# Patient Record
Sex: FEMALE | Race: BLACK OR AFRICAN AMERICAN | NOT HISPANIC OR LATINO | Employment: OTHER | URBAN - METROPOLITAN AREA
[De-identification: names, ages, dates, MRNs, and addresses within clinical notes are randomized per-mention and may not be internally consistent; named-entity substitution may affect disease eponyms.]

---

## 2017-05-17 PROBLEM — F41.8 DEPRESSION WITH ANXIETY: Status: ACTIVE | Noted: 2017-05-17

## 2017-05-17 PROBLEM — M81.0 OSTEOPOROSIS: Status: ACTIVE | Noted: 2017-05-17

## 2017-08-25 PROBLEM — B18.2 CHRONIC HEPATITIS C WITHOUT HEPATIC COMA: Status: ACTIVE | Noted: 2017-08-25

## 2017-08-25 PROBLEM — R05.9 COUGH: Status: ACTIVE | Noted: 2017-08-25

## 2017-08-25 PROBLEM — F17.200 TOBACCO DEPENDENCE: Status: ACTIVE | Noted: 2017-08-25

## 2017-08-25 PROBLEM — R64 CACHEXIA: Chronic | Status: ACTIVE | Noted: 2017-08-25

## 2017-10-08 PROBLEM — Z00.00 HEALTHCARE MAINTENANCE: Status: ACTIVE | Noted: 2017-10-08

## 2018-01-08 PROBLEM — Z00.00 HEALTHCARE MAINTENANCE: Status: RESOLVED | Noted: 2017-10-08 | Resolved: 2018-01-08

## 2018-02-17 ENCOUNTER — NURSE TRIAGE (OUTPATIENT)
Dept: ADMINISTRATIVE | Facility: CLINIC | Age: 68
End: 2018-02-17

## 2018-09-10 PROBLEM — Z00.00 HEALTHCARE MAINTENANCE: Status: RESOLVED | Noted: 2017-10-08 | Resolved: 2018-09-10

## 2018-09-21 ENCOUNTER — TELEPHONE (OUTPATIENT)
Dept: ADMINISTRATIVE | Facility: HOSPITAL | Age: 68
End: 2018-09-21

## 2019-03-19 RX ORDER — QUETIAPINE FUMARATE 200 MG/1
TABLET, FILM COATED ORAL
Qty: 30 TABLET | Refills: 0 | OUTPATIENT
Start: 2019-03-19

## 2019-03-19 RX ORDER — QUETIAPINE FUMARATE 200 MG/1
TABLET, FILM COATED ORAL
Qty: 30 TABLET | Refills: 0 | Status: SHIPPED | OUTPATIENT
Start: 2019-03-19

## 2019-10-03 ENCOUNTER — PATIENT OUTREACH (OUTPATIENT)
Dept: ADMINISTRATIVE | Facility: HOSPITAL | Age: 69
End: 2019-10-03

## 2019-11-13 ENCOUNTER — PATIENT OUTREACH (OUTPATIENT)
Dept: ADMINISTRATIVE | Facility: HOSPITAL | Age: 69
End: 2019-11-13

## 2020-02-12 ENCOUNTER — TELEPHONE (OUTPATIENT)
Dept: PAIN MEDICINE | Facility: CLINIC | Age: 70
End: 2020-02-12

## 2020-02-12 NOTE — TELEPHONE ENCOUNTER
Staff contacted the patient to confirm his 02/13/20 8:30 am appointment with Dr. Bingham and review IPM. Patient can contact our office at [phone] to reschedule or cancel if needed.      Patient confirmed the appointment and verbalized understanding or the appointment date and time. Patient verified the above information and location of office visit as well.

## 2020-04-08 ENCOUNTER — TELEPHONE (OUTPATIENT)
Dept: PAIN MEDICINE | Facility: CLINIC | Age: 70
End: 2020-04-08

## 2020-04-08 NOTE — TELEPHONE ENCOUNTER
lvm for patient in reference to rescheduling appt staff left callback number for patient so that we can further discuss appt options

## 2020-04-14 ENCOUNTER — TELEPHONE (OUTPATIENT)
Dept: PAIN MEDICINE | Facility: CLINIC | Age: 70
End: 2020-04-14

## 2020-04-14 NOTE — TELEPHONE ENCOUNTER
Spoke with patient regarding message and back pain, patient has never seen Dr Bingham before, declined to re-schedule missed appointment, stated she would call back to re-schedule her appointment

## 2020-04-14 NOTE — TELEPHONE ENCOUNTER
----- Message from Lucía Mitchell sent at 4/14/2020 10:47 AM CDT -----  Contact: pt  Name of Who is Calling: Jesika Wyman      What is the request in detail: pt states that she is in pain and would like to know if she can get something for her back. States that she missed her appointment and would like something until she can come in. Please contact to further discuss and advise.      Can the clinic reply by [facility]: n      What Number to [facility] Back [facility]: [phone]

## 2021-08-17 ENCOUNTER — IMMUNIZATION (OUTPATIENT)
Dept: PRIMARY CARE CLINIC | Facility: CLINIC | Age: 71
End: 2021-08-17
Payer: MEDICARE

## 2021-08-17 DIAGNOSIS — Z23 NEED FOR VACCINATION: Primary | ICD-10-CM

## 2021-08-17 PROCEDURE — 91300 COVID-19, MRNA, LNP-S, PF, 30 MCG/0.3 ML DOSE VACCINE: ICD-10-PCS | Mod: S$GLB,,, | Performed by: INTERNAL MEDICINE

## 2021-08-17 PROCEDURE — 0001A COVID-19, MRNA, LNP-S, PF, 30 MCG/0.3 ML DOSE VACCINE: ICD-10-PCS | Mod: CV19,S$GLB,, | Performed by: INTERNAL MEDICINE

## 2021-08-17 PROCEDURE — 91300 COVID-19, MRNA, LNP-S, PF, 30 MCG/0.3 ML DOSE VACCINE: CPT | Mod: S$GLB,,, | Performed by: INTERNAL MEDICINE

## 2021-08-17 PROCEDURE — 0001A COVID-19, MRNA, LNP-S, PF, 30 MCG/0.3 ML DOSE VACCINE: CPT | Mod: CV19,S$GLB,, | Performed by: INTERNAL MEDICINE

## 2021-09-08 ENCOUNTER — IMMUNIZATION (OUTPATIENT)
Dept: INTERNAL MEDICINE | Facility: CLINIC | Age: 71
End: 2021-09-08
Payer: MEDICARE

## 2021-09-08 DIAGNOSIS — Z23 NEED FOR VACCINATION: Primary | ICD-10-CM

## 2021-09-08 PROCEDURE — 91300 COVID-19, MRNA, LNP-S, PF, 30 MCG/0.3 ML DOSE VACCINE: CPT | Mod: ,,, | Performed by: INTERNAL MEDICINE

## 2021-09-08 PROCEDURE — 0002A COVID-19, MRNA, LNP-S, PF, 30 MCG/0.3 ML DOSE VACCINE: ICD-10-PCS | Mod: CV19,,, | Performed by: INTERNAL MEDICINE

## 2021-09-08 PROCEDURE — 0002A COVID-19, MRNA, LNP-S, PF, 30 MCG/0.3 ML DOSE VACCINE: CPT | Mod: CV19,,, | Performed by: INTERNAL MEDICINE

## 2021-09-08 PROCEDURE — 91300 COVID-19, MRNA, LNP-S, PF, 30 MCG/0.3 ML DOSE VACCINE: ICD-10-PCS | Mod: ,,, | Performed by: INTERNAL MEDICINE

## 2022-04-18 ENCOUNTER — PATIENT MESSAGE (OUTPATIENT)
Dept: PAIN MEDICINE | Facility: CLINIC | Age: 72
End: 2022-04-18
Payer: MEDICARE

## 2022-04-19 ENCOUNTER — OFFICE VISIT (OUTPATIENT)
Dept: SPINE | Facility: CLINIC | Age: 72
End: 2022-04-19
Attending: ANESTHESIOLOGY
Payer: MEDICARE

## 2022-04-19 VITALS
DIASTOLIC BLOOD PRESSURE: 75 MMHG | HEART RATE: 75 BPM | RESPIRATION RATE: 18 BRPM | BODY MASS INDEX: 15.24 KG/M2 | HEIGHT: 66 IN | SYSTOLIC BLOOD PRESSURE: 144 MMHG | TEMPERATURE: 98 F | WEIGHT: 94.81 LBS

## 2022-04-19 DIAGNOSIS — M54.9 INTRACTABLE BACK PAIN: ICD-10-CM

## 2022-04-19 DIAGNOSIS — M48.061 SPINAL STENOSIS OF LUMBAR REGION, UNSPECIFIED WHETHER NEUROGENIC CLAUDICATION PRESENT: ICD-10-CM

## 2022-04-19 DIAGNOSIS — M47.26 OSTEOARTHRITIS OF SPINE WITH RADICULOPATHY, LUMBAR REGION: Primary | ICD-10-CM

## 2022-04-19 PROCEDURE — 1125F AMNT PAIN NOTED PAIN PRSNT: CPT | Mod: CPTII,S$GLB,, | Performed by: ANESTHESIOLOGY

## 2022-04-19 PROCEDURE — 99999 PR PBB SHADOW E&M-EST. PATIENT-LVL III: ICD-10-PCS | Mod: PBBFAC,,, | Performed by: ANESTHESIOLOGY

## 2022-04-19 PROCEDURE — 1160F RVW MEDS BY RX/DR IN RCRD: CPT | Mod: CPTII,S$GLB,, | Performed by: ANESTHESIOLOGY

## 2022-04-19 PROCEDURE — 99205 OFFICE O/P NEW HI 60 MIN: CPT | Mod: S$GLB,,, | Performed by: ANESTHESIOLOGY

## 2022-04-19 PROCEDURE — 1159F PR MEDICATION LIST DOCUMENTED IN MEDICAL RECORD: ICD-10-PCS | Mod: CPTII,S$GLB,, | Performed by: ANESTHESIOLOGY

## 2022-04-19 PROCEDURE — 1160F PR REVIEW ALL MEDS BY PRESCRIBER/CLIN PHARMACIST DOCUMENTED: ICD-10-PCS | Mod: CPTII,S$GLB,, | Performed by: ANESTHESIOLOGY

## 2022-04-19 PROCEDURE — 3078F DIAST BP <80 MM HG: CPT | Mod: CPTII,S$GLB,, | Performed by: ANESTHESIOLOGY

## 2022-04-19 PROCEDURE — 99999 PR PBB SHADOW E&M-EST. PATIENT-LVL III: CPT | Mod: PBBFAC,,, | Performed by: ANESTHESIOLOGY

## 2022-04-19 PROCEDURE — 3008F BODY MASS INDEX DOCD: CPT | Mod: CPTII,S$GLB,, | Performed by: ANESTHESIOLOGY

## 2022-04-19 PROCEDURE — 3078F PR MOST RECENT DIASTOLIC BLOOD PRESSURE < 80 MM HG: ICD-10-PCS | Mod: CPTII,S$GLB,, | Performed by: ANESTHESIOLOGY

## 2022-04-19 PROCEDURE — 3077F SYST BP >= 140 MM HG: CPT | Mod: CPTII,S$GLB,, | Performed by: ANESTHESIOLOGY

## 2022-04-19 PROCEDURE — 1100F PR PT FALLS ASSESS DOC 2+ FALLS/FALL W/INJURY/YR: ICD-10-PCS | Mod: CPTII,S$GLB,, | Performed by: ANESTHESIOLOGY

## 2022-04-19 PROCEDURE — 3008F PR BODY MASS INDEX (BMI) DOCUMENTED: ICD-10-PCS | Mod: CPTII,S$GLB,, | Performed by: ANESTHESIOLOGY

## 2022-04-19 PROCEDURE — 1100F PTFALLS ASSESS-DOCD GE2>/YR: CPT | Mod: CPTII,S$GLB,, | Performed by: ANESTHESIOLOGY

## 2022-04-19 PROCEDURE — 3288F PR FALLS RISK ASSESSMENT DOCUMENTED: ICD-10-PCS | Mod: CPTII,S$GLB,, | Performed by: ANESTHESIOLOGY

## 2022-04-19 PROCEDURE — 3288F FALL RISK ASSESSMENT DOCD: CPT | Mod: CPTII,S$GLB,, | Performed by: ANESTHESIOLOGY

## 2022-04-19 PROCEDURE — 1159F MED LIST DOCD IN RCRD: CPT | Mod: CPTII,S$GLB,, | Performed by: ANESTHESIOLOGY

## 2022-04-19 PROCEDURE — 1125F PR PAIN SEVERITY QUANTIFIED, PAIN PRESENT: ICD-10-PCS | Mod: CPTII,S$GLB,, | Performed by: ANESTHESIOLOGY

## 2022-04-19 PROCEDURE — 3077F PR MOST RECENT SYSTOLIC BLOOD PRESSURE >= 140 MM HG: ICD-10-PCS | Mod: CPTII,S$GLB,, | Performed by: ANESTHESIOLOGY

## 2022-04-19 PROCEDURE — 99205 PR OFFICE/OUTPT VISIT, NEW, LEVL V, 60-74 MIN: ICD-10-PCS | Mod: S$GLB,,, | Performed by: ANESTHESIOLOGY

## 2022-04-19 RX ORDER — HYDROCODONE BITARTRATE AND ACETAMINOPHEN 5; 325 MG/1; MG/1
1 TABLET ORAL EVERY 8 HOURS PRN
Qty: 90 TABLET | Refills: 0 | Status: SHIPPED | OUTPATIENT
Start: 2022-04-19 | End: 2022-05-10 | Stop reason: SDUPTHER

## 2022-04-19 NOTE — PROGRESS NOTES
Subjective:      Patient ID: Jesika Wyman is a 71 y.o. female.    Chief Complaint: Low-back Pain    Referred by: Self, Ishan EASLEY  This is a patient of Dr. Duque who recently retired.  He had her on hydrocodone 5 q.i.d..  She was staying in [facility] from her primary care physician.  She said these were providing her good relief.  She ran out of medications about a month ago.  She did not have withdrawal symptoms but the pain was severe.  She had significant scoliosis.  She has lumbar spine MRI with multilevel degenerative disease and variable amount of foraminal stenosis and mild central canal stenosis.  MRI was reviewed.  She denied having any bowel or bladder symptomatology.  She has radicular symptoms down the right lower extremity in an L4 or L5 distribution.  She occasionally will feel weakness or give away type sensation.  Occasionally she will have tingling and numbness down the right lower extremity.  When I asked her about surgical intervention for her scoliotic curvature and lumbar spine pain she said the surgeon told her it is too late and this should have happened when she was a teenager.  Previous interventional management was once done and cause her more pain than pain relief.  She does not want to try again.  No recent weight changes.  No history of cancer.    Past Medical History:   Diagnosis Date    COPD (chronic obstructive pulmonary disease)     Hemorrhoids     Insomnia        Past Surgical History:   Procedure Laterality Date    HEMORRHOID SURGERY         Review of patient's allergies indicates:   Allergen Reactions    Tramadol Nausea And Vomiting    Ibuprofen Nausea And Vomiting       Current Outpatient Medications   Medication Sig Dispense Refill    QUEtiapine (SEROQUEL) 200 MG Tab TAKE 1 TABLET BY MOUTH EVERY EVENING 30 tablet 0    albuterol 90 mcg/actuation inhaler Inhale 2 puffs into the lungs every 6 (six) hours as needed for Wheezing. Rescue 1 Inhaler 11    citalopram  "(CELEXA) 10 MG tablet Take 1 tablet (10 mg total) by mouth once daily. 30 tablet 11    HYDROcodone-acetaminophen (NORCO) 5-325 mg per tablet Take 1 tablet by mouth every 8 (eight) hours as needed for Pain. Take for breakthrough pain. Severe spinal stenosis and radiculopathy 90 tablet 0    hydrOXYzine HCl (ATARAX) 25 MG tablet Take 1 tablet (25 mg total) by mouth 2 (two) times daily as needed for Anxiety. (Patient not taking: Reported on 4/19/2022) 30 tablet 1    morphine (MS CONTIN) 15 MG 12 hr tablet Take 1 tablet (15 mg total) by mouth 2 (two) times daily. (Patient not taking: Reported on 4/19/2022) 60 tablet 0    umeclidinium-vilanterol (ANORO ELLIPTA) 62.5-25 mcg/actuation DsDv Inhale 1 puff into the lungs once daily. (Patient not taking: Reported on 4/19/2022) 60 each 11     No current facility-administered medications for this visit.       Family History   Problem Relation Age of Onset    Hypertension Mother     Cancer Father        Social History     Socioeconomic History    Marital status: Single    Years of education: 11   Tobacco Use    Smoking status: Current Every Day Smoker     Packs/day: 0.25     Years: 35.00     Pack years: 8.75     Types: Cigarettes    Smokeless tobacco: Never Used   Substance and Sexual Activity    Alcohol use: No     Alcohol/week: 0.0 standard drinks    Drug use: No           ROS        Objective:   BP (!) 144/75   Pulse 75   Temp 98 °F (36.7 °C)   Resp 18   Ht 5' 6" (1.676 m)   Wt 43 kg (94 lb 12.8 oz)   BMI 15.30 kg/m²   Pain Disability Index Review:  Last 3 PDI Scores 4/19/2022   Pain Disability Index (PDI) 63     Normocephalic.  Atraumatic.  Affect appropriate.  Breathing unlabored.  Extra ocular muscles intact.               General Musculoskeletal Exam   Gait: normal     Right Ankle/Foot Exam     Tests   Heel Walk: able to perform  Tiptoe Walk: able to perform    Left Ankle/Foot Exam     Tests   Heel Walk: able to perform  Tiptoe Walk: able to " perform      Right Hip Exam     Range of Motion   External rotation: normal   Internal rotation: normal     Tests   Pain w/ forced internal rotation (SINDHU): absent  Kym: negative    Other   Sensation: normal  Left Hip Exam     Range of Motion   External rotation: normal   Internal rotation: normal     Tests   Pain w/ forced internal rotation (SINDHU): absent  Kym: negative    Other   Sensation: normal      Back (L-Spine & T-Spine) / Neck (C-Spine) Exam     Back (L-Spine & T-Spine) Range of Motion   Extension: abnormal Back extension: Limited.  Scoliotic curvature noted.   Flexion: abnormal Back flexion: Limited scoliotic curvature noted.     Back (L-Spine & T-Spine) Tests   Right Side Tests  Femoral Stretch: negative  Left Side Tests  Femoral Stretch: negative    Comments:  FACET LOADING:  Positive bilaterally.    Positive pain with lumbar hyperextension.  Lumbar flexion limited.  Positive Bartolo's test for lower back pain.  Positive Milgram's.  Positive straight leg raise on the right side.        Muscle Strength   Right Lower Extremity   Hip Flexion: 5/5   Quadriceps:  5/5   Hamstrin/5   Gastrocsoleus:  5/5   EHL:  5/5  Left Lower Extremity   Hip Flexion: 5/5   Quadriceps:  5/5   Hamstrin/5   Gastrocsoleus:  5/5   EHL:  5/5    Reflexes     Left Side  Achilles:  0  Babinski Sign:  absent  Ankle Clonus:  absent  Quadriceps:  1+    Right Side   Achilles:  0  Babinski Sign:  absent  Ankle Clonus:  absent  Quadriceps:  1+    Vascular Exam     Right Pulses  Dorsalis Pedis:      1+          Left Pulses  Dorsalis Pedis:      1+          Capillary Refill  Right Hand: normal capillary refill  Left Hand: normal capillary refill        Edema  Right Upper Leg: absent  Left Upper Leg: absent        Assessment:       Encounter Diagnoses   Name Primary?    Osteoarthritis of spine with radiculopathy, lumbar region Yes    Intractable back pain     Spinal stenosis of lumbar region, unspecified whether neurogenic  claudication present          Plan:   We discussed with the patient the assessment and recommendations. The following is the plan we agreed on:  1. We will reduce her hydrocodone to 3 times a day and she is in agreement.  2. Offered her physical therapy and/or interventional management but she declined.  She is satisfied on the medications.  3. Return as needed.  Otherwise follow-up in 3 months to refill her medications.        Jesika was seen today for low-back pain.    Diagnoses and all orders for this visit:    Osteoarthritis of spine with radiculopathy, lumbar region  -     HYDROcodone-acetaminophen (NORCO) 5-325 mg per tablet; Take 1 tablet by mouth every 8 (eight) hours as needed for Pain. Take for breakthrough pain. Severe spinal stenosis and radiculopathy    Intractable back pain  -     HYDROcodone-acetaminophen (NORCO) 5-325 mg per tablet; Take 1 tablet by mouth every 8 (eight) hours as needed for Pain. Take for breakthrough pain. Severe spinal stenosis and radiculopathy    Spinal stenosis of lumbar region, unspecified whether neurogenic claudication present  -     HYDROcodone-acetaminophen (NORCO) 5-325 mg per tablet; Take 1 tablet by mouth every 8 (eight) hours as needed for Pain. Take for breakthrough pain. Severe spinal stenosis and radiculopathy

## 2022-05-10 ENCOUNTER — TELEPHONE (OUTPATIENT)
Dept: PAIN MEDICINE | Facility: CLINIC | Age: 72
End: 2022-05-10
Payer: MEDICARE

## 2022-05-10 DIAGNOSIS — M54.9 INTRACTABLE BACK PAIN: ICD-10-CM

## 2022-05-10 DIAGNOSIS — M48.061 SPINAL STENOSIS OF LUMBAR REGION, UNSPECIFIED WHETHER NEUROGENIC CLAUDICATION PRESENT: ICD-10-CM

## 2022-05-10 DIAGNOSIS — M47.26 OSTEOARTHRITIS OF SPINE WITH RADICULOPATHY, LUMBAR REGION: ICD-10-CM

## 2022-05-10 RX ORDER — HYDROCODONE BITARTRATE AND ACETAMINOPHEN 5; 325 MG/1; MG/1
1 TABLET ORAL EVERY 8 HOURS PRN
Qty: 90 TABLET | Refills: 0 | Status: SHIPPED | OUTPATIENT
Start: 2022-05-18 | End: 2022-05-16 | Stop reason: SDUPTHER

## 2022-05-10 NOTE — TELEPHONE ENCOUNTER
----- Message from Ewelina Saldaña sent at 5/10/2022  9:49 AM CDT -----  Type:  RX Refill Request    Who Called: TENZIN   Refill or New Rx:refill   RX Name and Strength:HYDROcodone-acetaminophen (NORCO) 5-325 mg per tablet  How is the patient currently taking it? (ex. 1XDay): 3 X DAY   Is this a 30 day or 90 day RX:90  Preferred Pharmacy with phone number:[facility] #[id number] [address] or Mail Order:LOCAL   Ordering Provider:Ijeoma Currie,  Would the patient rather a call back or a response via [facility]? CALL   Best Call Back [phone]  Additional Information: Pt called and said she is out of medication to please call for refill

## 2022-05-10 NOTE — TELEPHONE ENCOUNTER
----- Message from Emilee Kelli sent at 5/10/2022  2:15 PM CDT -----  Contact: TENZIN DUVALL [[id number]]  Type: Call Back      Who called: TENZIN DUVALL [[id number]]      What is the request in detail: Patient is requesting a call back in regards to her HYDROcodone-acetaminophen (NORCO) 5-325 mg per tablet. She states that it is due on 05/18 and she would like to know if she can get it today. Please advise.       Can the clinic reply by [facility]?  No      Would the patient rather a call back or a response via [facility]? Call back       Best call back number: [phone] (mobile)      Additional Information:

## 2022-05-10 NOTE — TELEPHONE ENCOUNTER
Patient requesting refill on Norco 5/325mg  Last office visit 04.19.22   shows last refill on 04.19.22  Patient does have a pain contract on file with [facility]  Patient last UDS No UDS on file was not consistent with current therapy

## 2022-05-10 NOTE — TELEPHONE ENCOUNTER
Staff return call to patient in regards to her message.      Staff apologized and inform patient she can have an early fill date her medication is schedule for 5/18/22       Pt verbalized understanding.

## 2022-05-16 DIAGNOSIS — M54.9 INTRACTABLE BACK PAIN: ICD-10-CM

## 2022-05-16 DIAGNOSIS — M47.26 OSTEOARTHRITIS OF SPINE WITH RADICULOPATHY, LUMBAR REGION: Primary | ICD-10-CM

## 2022-05-16 DIAGNOSIS — M48.061 SPINAL STENOSIS OF LUMBAR REGION, UNSPECIFIED WHETHER NEUROGENIC CLAUDICATION PRESENT: ICD-10-CM

## 2022-05-16 RX ORDER — HYDROCODONE BITARTRATE AND ACETAMINOPHEN 5; 325 MG/1; MG/1
1 TABLET ORAL EVERY 8 HOURS PRN
Qty: 90 TABLET | Refills: 0 | Status: SHIPPED | OUTPATIENT
Start: 2022-05-19 | End: 2022-06-08 | Stop reason: SDUPTHER

## 2022-05-16 NOTE — TELEPHONE ENCOUNTER
Patient was last seen in the office 04/19/22 by . This is a  patient. Patient last received this medication 04/19/22.patient has no UDS on file. Patient has a pain contract on file. Patient has a follow up 07/19/22.

## 2022-05-17 NOTE — TELEPHONE ENCOUNTER
Staff contacted patient in regards to her medication refill.        Staff informed patient that her refill was sent into her pharmacy to be filled on 5/19/22      Pt verbalized understanding.

## 2022-06-08 DIAGNOSIS — M47.26 OSTEOARTHRITIS OF SPINE WITH RADICULOPATHY, LUMBAR REGION: ICD-10-CM

## 2022-06-08 DIAGNOSIS — M54.9 INTRACTABLE BACK PAIN: ICD-10-CM

## 2022-06-08 DIAGNOSIS — M48.061 SPINAL STENOSIS OF LUMBAR REGION, UNSPECIFIED WHETHER NEUROGENIC CLAUDICATION PRESENT: ICD-10-CM

## 2022-06-08 RX ORDER — HYDROCODONE BITARTRATE AND ACETAMINOPHEN 5; 325 MG/1; MG/1
1 TABLET ORAL EVERY 8 HOURS PRN
Qty: 90 TABLET | Refills: 0 | Status: SHIPPED | OUTPATIENT
Start: 2022-06-17 | End: 2022-07-19 | Stop reason: SDUPTHER

## 2022-06-08 NOTE — TELEPHONE ENCOUNTER
Patient requesting refill on Norco 5/325mg  Last office visit 04.19.22   shows last refill on 05.18.22  Patient does have a pain contract on file with [facility]  Patient last UDS No UDS on file was not consistent with current therapy

## 2022-06-08 NOTE — TELEPHONE ENCOUNTER
----- Message from Jennifer Sr sent at 6/8/2022 10:56 AM CDT -----  Regarding: Refill Request  Who Called:TENZIN DUVALL [[id number]]        Refill or New Rx Refill         RX Name and Strength: HYDROcodone-acetaminophen (NORCO) 5-325 mg per tablet        How is the patient currently taking it? (ex. 1XDay): Take 1 tablet by mouth every 8 (eight) hours as needed for Pain. Take for breakthrough pain. Severe spinal stenosis and radiculopathy - Oral        Is this a 30 day or 90 day RX: 90         Preferred Pharmacy with phone number: [facility] #[id number] - [facility] [address] [facility] AT [address]   [phone]        Local or Mail Order: Local          Ordering Provider: Bill Currie         Would the patient rather a call back or a response via [facility]?        Best Call Back Number:[phone]        Additional Information:

## 2022-07-11 ENCOUNTER — TELEPHONE (OUTPATIENT)
Dept: PAIN MEDICINE | Facility: CLINIC | Age: 72
End: 2022-07-11
Payer: MEDICARE

## 2022-07-11 NOTE — TELEPHONE ENCOUNTER
Staff tried to call pt no answer. SG----- Message from Suzanne Marie sent at 7/11/2022  4:33 PM CDT -----  Can the clinic reply in [facility]:no              Please refill the medication(s) listed below. Please call the patient when the prescription(s) is ready for  at this phone [phone]              Medication #1HYDROcodone-acetaminophen (NORCO) 5-325 mg per tablet            Medication #2              Preferred Pharmacy:[facility] #[id number] - [address]

## 2022-07-18 ENCOUNTER — PATIENT MESSAGE (OUTPATIENT)
Dept: PAIN MEDICINE | Facility: CLINIC | Age: 72
End: 2022-07-18
Payer: MEDICARE

## 2022-07-19 ENCOUNTER — OFFICE VISIT (OUTPATIENT)
Dept: PAIN MEDICINE | Facility: CLINIC | Age: 72
End: 2022-07-19
Payer: MEDICARE

## 2022-07-19 VITALS
HEART RATE: 73 BPM | WEIGHT: 89.75 LBS | DIASTOLIC BLOOD PRESSURE: 68 MMHG | SYSTOLIC BLOOD PRESSURE: 140 MMHG | HEIGHT: 66 IN | BODY MASS INDEX: 14.42 KG/M2 | TEMPERATURE: 97 F

## 2022-07-19 DIAGNOSIS — M48.061 SPINAL STENOSIS OF LUMBAR REGION, UNSPECIFIED WHETHER NEUROGENIC CLAUDICATION PRESENT: ICD-10-CM

## 2022-07-19 DIAGNOSIS — M47.26 OSTEOARTHRITIS OF SPINE WITH RADICULOPATHY, LUMBAR REGION: ICD-10-CM

## 2022-07-19 DIAGNOSIS — M41.9 SCOLIOSIS, UNSPECIFIED SCOLIOSIS TYPE, UNSPECIFIED SPINAL REGION: Primary | ICD-10-CM

## 2022-07-19 DIAGNOSIS — M54.9 INTRACTABLE BACK PAIN: ICD-10-CM

## 2022-07-19 DIAGNOSIS — Z79.891 ENCOUNTER FOR LONG-TERM OPIATE ANALGESIC USE: ICD-10-CM

## 2022-07-19 PROCEDURE — 3078F DIAST BP <80 MM HG: CPT | Mod: CPTII,S$GLB,, | Performed by: NURSE PRACTITIONER

## 2022-07-19 PROCEDURE — 3078F PR MOST RECENT DIASTOLIC BLOOD PRESSURE < 80 MM HG: ICD-10-PCS | Mod: CPTII,S$GLB,, | Performed by: NURSE PRACTITIONER

## 2022-07-19 PROCEDURE — 99214 PR OFFICE/OUTPT VISIT, EST, LEVL IV, 30-39 MIN: ICD-10-PCS | Mod: S$GLB,,, | Performed by: NURSE PRACTITIONER

## 2022-07-19 PROCEDURE — 1159F MED LIST DOCD IN RCRD: CPT | Mod: CPTII,S$GLB,, | Performed by: NURSE PRACTITIONER

## 2022-07-19 PROCEDURE — 99999 PR PBB SHADOW E&M-EST. PATIENT-LVL III: CPT | Mod: PBBFAC,,, | Performed by: NURSE PRACTITIONER

## 2022-07-19 PROCEDURE — 3008F PR BODY MASS INDEX (BMI) DOCUMENTED: ICD-10-PCS | Mod: CPTII,S$GLB,, | Performed by: NURSE PRACTITIONER

## 2022-07-19 PROCEDURE — 1101F PT FALLS ASSESS-DOCD LE1/YR: CPT | Mod: CPTII,S$GLB,, | Performed by: NURSE PRACTITIONER

## 2022-07-19 PROCEDURE — 1125F PR PAIN SEVERITY QUANTIFIED, PAIN PRESENT: ICD-10-PCS | Mod: CPTII,S$GLB,, | Performed by: NURSE PRACTITIONER

## 2022-07-19 PROCEDURE — 99214 OFFICE O/P EST MOD 30 MIN: CPT | Mod: S$GLB,,, | Performed by: NURSE PRACTITIONER

## 2022-07-19 PROCEDURE — 3288F PR FALLS RISK ASSESSMENT DOCUMENTED: ICD-10-PCS | Mod: CPTII,S$GLB,, | Performed by: NURSE PRACTITIONER

## 2022-07-19 PROCEDURE — 99999 PR PBB SHADOW E&M-EST. PATIENT-LVL III: ICD-10-PCS | Mod: PBBFAC,,, | Performed by: NURSE PRACTITIONER

## 2022-07-19 PROCEDURE — 1101F PR PT FALLS ASSESS DOC 0-1 FALLS W/OUT INJ PAST YR: ICD-10-PCS | Mod: CPTII,S$GLB,, | Performed by: NURSE PRACTITIONER

## 2022-07-19 PROCEDURE — 1160F RVW MEDS BY RX/DR IN RCRD: CPT | Mod: CPTII,S$GLB,, | Performed by: NURSE PRACTITIONER

## 2022-07-19 PROCEDURE — 1160F PR REVIEW ALL MEDS BY PRESCRIBER/CLIN PHARMACIST DOCUMENTED: ICD-10-PCS | Mod: CPTII,S$GLB,, | Performed by: NURSE PRACTITIONER

## 2022-07-19 PROCEDURE — 3008F BODY MASS INDEX DOCD: CPT | Mod: CPTII,S$GLB,, | Performed by: NURSE PRACTITIONER

## 2022-07-19 PROCEDURE — 3077F PR MOST RECENT SYSTOLIC BLOOD PRESSURE >= 140 MM HG: ICD-10-PCS | Mod: CPTII,S$GLB,, | Performed by: NURSE PRACTITIONER

## 2022-07-19 PROCEDURE — 1125F AMNT PAIN NOTED PAIN PRSNT: CPT | Mod: CPTII,S$GLB,, | Performed by: NURSE PRACTITIONER

## 2022-07-19 PROCEDURE — 3077F SYST BP >= 140 MM HG: CPT | Mod: CPTII,S$GLB,, | Performed by: NURSE PRACTITIONER

## 2022-07-19 PROCEDURE — 3288F FALL RISK ASSESSMENT DOCD: CPT | Mod: CPTII,S$GLB,, | Performed by: NURSE PRACTITIONER

## 2022-07-19 PROCEDURE — 80326 AMPHETAMINES 5 OR MORE: CPT | Performed by: NURSE PRACTITIONER

## 2022-07-19 PROCEDURE — 1159F PR MEDICATION LIST DOCUMENTED IN MEDICAL RECORD: ICD-10-PCS | Mod: CPTII,S$GLB,, | Performed by: NURSE PRACTITIONER

## 2022-07-19 RX ORDER — HYDROCODONE BITARTRATE AND ACETAMINOPHEN 5; 325 MG/1; MG/1
1 TABLET ORAL EVERY 6 HOURS PRN
Qty: 120 TABLET | Refills: 0 | Status: SHIPPED | OUTPATIENT
Start: 2022-07-19 | End: 2022-08-18 | Stop reason: SDUPTHER

## 2022-07-19 NOTE — PROGRESS NOTES
Subjective:      Patient ID: Jesika Wyman is a 71 y.o. female.    Chief Complaint: No chief complaint on file.    Referred by: No ref. provider found     HPI       Interval History 7/19/2022:  The patient is here for follow up of chronic back pain secondary to scoliosis. She was evaluated by Dr. Currie at last OV and he assumed care from Dr. Duque who retired. She was decreased from Norco 5/325 mg QID to TID. She says that she is having more pain since decreasing and is requesting to go back to QID. Her pain is primarily across the lower back and is sharp and stabbing in nature. She has some leg pain with walking. Her overall pain today is 10/10.    Initial Encounter:  This is a patient of Dr. Duque who recently retired.  He had her on hydrocodone 5 q.i.d..  [facility] [place] [facility] [place] [facility] [place] [facility].  She said these were providing her good relief.  She ran out of medications about a month ago.  She did not have withdrawal symptoms but the pain was severe.  She had significant scoliosis.  She has lumbar spine MRI with multilevel degenerative disease and variable amount of foraminal stenosis and mild central canal stenosis.  MRI was reviewed.  She denied having any bowel or bladder symptomatology.  She has radicular symptoms down the right lower extremity in an L4 or L5 distribution.  She occasionally will feel weakness or give away type sensation.  Occasionally she will have tingling and numbness down the right lower extremity.  When I asked her about surgical intervention for her scoliotic curvature and lumbar spine pain she said the surgeon told her it is too late and this should have happened when she was a teenager.  Previous interventional management was once done and cause her more pain than pain relief.  She does not want to try again.  No recent weight changes.  No history of cancer.    Past Medical History:   Diagnosis Date    COPD (chronic obstructive pulmonary disease)      "Hemorrhoids     Insomnia        Past Surgical History:   Procedure Laterality Date    HEMORRHOID SURGERY         Review of patient's allergies indicates:   Allergen Reactions    Tramadol Nausea And Vomiting    Ibuprofen Nausea And Vomiting       Current Outpatient Medications   Medication Sig Dispense Refill    albuterol 90 mcg/actuation inhaler Inhale 2 puffs into the lungs every 6 (six) hours as needed for Wheezing. Rescue 1 Inhaler 11    QUEtiapine (SEROQUEL) 200 MG Tab TAKE 1 TABLET BY MOUTH EVERY EVENING 30 tablet 0    citalopram (CELEXA) 10 MG tablet Take 1 tablet (10 mg total) by mouth once daily. (Patient not taking: Reported on 7/19/2022) 30 tablet 11    hydrOXYzine HCl (ATARAX) 25 MG tablet Take 1 tablet (25 mg total) by mouth 2 (two) times daily as needed for Anxiety. (Patient not taking: No sig reported) 30 tablet 1    morphine (MS CONTIN) 15 MG 12 hr tablet Take 1 tablet (15 mg total) by mouth 2 (two) times daily. (Patient not taking: No sig reported) 60 tablet 0    umeclidinium-vilanterol (ANORO ELLIPTA) 62.5-25 mcg/actuation DsDv Inhale 1 puff into the lungs once daily. (Patient not taking: No sig reported) 60 each 11     No current facility-administered medications for this visit.       Family History   Problem Relation Age of Onset    Hypertension Mother     Cancer Father        Social History     Socioeconomic History    Marital status: Single    Years of education: 11   Tobacco Use    Smoking status: Current Every Day Smoker     Packs/day: 0.25     Years: 35.00     Pack years: 8.75     Types: Cigarettes    Smokeless tobacco: Never Used   Substance and Sexual Activity    Alcohol use: No     Alcohol/week: 0.0 standard drinks    Drug use: No           ROS        Objective:   BP (!) 140/68 (BP Location: Right arm, Patient Position: Sitting, BP Method: Medium (Automatic))   Pulse 73   Temp 97.2 °F (36.2 °C)   Ht 5' 6" (1.676 m)   Wt 40.7 kg (89 lb 11.6 oz)   BMI 14.48 kg/m² "   Pain Disability Index Review:  Last 3 PDI Scores 2022   Pain Disability Index (PDI) 50 63     Normocephalic.  Atraumatic.  Affect appropriate.  Breathing unlabored.  Extra ocular muscles intact.               General Musculoskeletal Exam   Gait: normal     Right Ankle/Foot Exam     Tests   Heel Walk: able to perform  Tiptoe Walk: able to perform    Left Ankle/Foot Exam     Tests   Heel Walk: able to perform  Tiptoe Walk: able to perform      Right Hip Exam     Range of Motion   External rotation: normal   Internal rotation: normal     Tests   Pain w/ forced internal rotation (SINDHU): absent  Kym: negative    Other   Sensation: normal  Left Hip Exam     Range of Motion   External rotation: normal   Internal rotation: normal     Tests   Pain w/ forced internal rotation (SINDHU): absent  Kym: negative    Other   Sensation: normal      Back (L-Spine & T-Spine) / Neck (C-Spine) Exam     Back (L-Spine & T-Spine) Range of Motion   Extension: abnormal Back extension: Limited.  Scoliotic curvature noted.   Flexion: abnormal Back flexion: Limited scoliotic curvature noted.   Lateral bend right: abnormal   Lateral bend left: abnormal     Back (L-Spine & T-Spine) Tests   Right Side Tests  Femoral Stretch: negative  Left Side Tests  Femoral Stretch: negative    Comments:  FACET LOADING:  Positive bilaterally.    Positive pain with lumbar extension and flexion.  Positive facet loading bilaterally.  Notable scoliosis.        Muscle Strength   Right Lower Extremity   Hip Flexion: 5/5   Quadriceps:  5/5   Hamstrin/5   Gastrocsoleus:  5/5   EHL:  5/5  Left Lower Extremity   Hip Flexion: 5/5   Quadriceps:  5/5   Hamstrin/5   Gastrocsoleus:  5/5   EHL:  5/5    Reflexes     Left Side  Babinski Sign:  absent  Ankle Clonus:  absent    Right Side   Babinski Sign:  absent  Ankle Clonus:  absent    Vascular Exam       Capillary Refill  Right Hand: normal capillary refill  Left Hand: normal capillary  refill        Edema  Right Upper Leg: absent  Left Upper Leg: absent        Assessment:       Encounter Diagnoses   Name Primary?    Osteoarthritis of spine with radiculopathy, lumbar region     Intractable back pain     Spinal stenosis of lumbar region, unspecified whether neurogenic claudication present     Scoliosis, unspecified scoliosis type, unspecified spinal region Yes    Encounter for long-term opiate analgesic use          Plan:   We discussed with the patient the assessment and recommendations. The following is the plan we agreed on:    - Previous imaging was reviewed and discussed with the patient today.    - The patient will continue a home exercise routine to help with pain and strengthening.      - Dr. Currie discussed with pt and will increase Norco 5/325 mg from TID to QID PRN pain #120. Can call for 2 refills.    - Pain contract on file.    - UDS today. Labs reviewed.    - RTC in 3 months or sooner if needed.          Syeda Russ, RALF  07/19/2022

## 2022-07-24 LAB
6MAM UR QL: NOT DETECTED
7AMINOCLONAZEPAM UR QL: NOT DETECTED
A-OH ALPRAZ UR QL: NOT DETECTED
ALPHA-OH-MIDAZOLAM: NOT DETECTED
ALPRAZ UR QL: NOT DETECTED
AMPHET UR QL SCN: NOT DETECTED
ANNOTATION COMMENT IMP: NORMAL
ANNOTATION COMMENT IMP: NORMAL
BARBITURATES UR QL: NOT DETECTED
BUPRENORPHINE UR QL: NOT DETECTED
BZE UR QL: PRESENT
CARBOXYTHC UR QL: NOT DETECTED
CARISOPRODOL UR QL: NOT DETECTED
CLONAZEPAM UR QL: NOT DETECTED
CODEINE UR QL: NOT DETECTED
CREAT UR-MCNC: 89.9 MG/DL (ref 20–400)
DIAZEPAM UR QL: NOT DETECTED
ETHYL GLUCURONIDE UR QL: NOT DETECTED
FENTANYL UR QL: NOT DETECTED
GABAPENTIN: NOT DETECTED
HYDROCODONE UR QL: NOT DETECTED
HYDROMORPHONE UR QL: NOT DETECTED
LORAZEPAM UR QL: NOT DETECTED
MDA UR QL: NOT DETECTED
MDEA UR QL: NOT DETECTED
MDMA UR QL: NOT DETECTED
ME-PHENIDATE UR QL: NOT DETECTED
METHADONE UR QL: NOT DETECTED
METHAMPHET UR QL: NOT DETECTED
MIDAZOLAM UR QL SCN: NOT DETECTED
MORPHINE UR QL: NOT DETECTED
NALOXONE: NOT DETECTED
NORBUPRENORPHINE UR QL CFM: NOT DETECTED
NORDIAZEPAM UR QL: NOT DETECTED
NORFENTANYL UR QL: NOT DETECTED
NORHYDROCODONE UR QL CFM: NOT DETECTED
NORMEPERIDINE UR QL CFM: NOT DETECTED
NOROXYCODONE UR QL CFM: NOT DETECTED
NOROXYMORPHONE UR QL SCN: NOT DETECTED
OXAZEPAM UR QL: NOT DETECTED
OXYCODONE UR QL: NOT DETECTED
OXYMORPHONE UR QL: NOT DETECTED
PATHOLOGY STUDY: NORMAL
PCP UR QL: NOT DETECTED
PHENTERMINE UR QL: NOT DETECTED
PREGABALIN: NOT DETECTED
SERVICE CMNT-IMP: NORMAL
TAPENTADOL UR QL SCN: NOT DETECTED
TAPENTADOL UR QL SCN: NOT DETECTED
TEMAZEPAM UR QL: NOT DETECTED
TRAMADOL UR QL: NOT DETECTED
ZOLPIDEM METABOLITE: NOT DETECTED
ZOLPIDEM UR QL: NOT DETECTED

## 2022-08-17 ENCOUNTER — TELEPHONE (OUTPATIENT)
Dept: PAIN MEDICINE | Facility: CLINIC | Age: 72
End: 2022-08-17
Payer: MEDICARE

## 2022-08-17 NOTE — TELEPHONE ENCOUNTER
Staff spoke with pt and she's going to [place] for 1 month and she wants future prescriptions to be sent to [address] [place]. It is already in the pt chart just needs to be switched.

## 2022-08-18 DIAGNOSIS — M54.9 INTRACTABLE BACK PAIN: ICD-10-CM

## 2022-08-18 DIAGNOSIS — M47.26 OSTEOARTHRITIS OF SPINE WITH RADICULOPATHY, LUMBAR REGION: ICD-10-CM

## 2022-08-18 DIAGNOSIS — M48.061 SPINAL STENOSIS OF LUMBAR REGION, UNSPECIFIED WHETHER NEUROGENIC CLAUDICATION PRESENT: ICD-10-CM

## 2022-08-18 NOTE — TELEPHONE ENCOUNTER
Patient requesting refill on Norco 5/325mg  Last office visit 07.19.22   shows last refill on 07.19.22  Patient does have a pain contract on file with [facility]  Patient last UDS 07.19.22 was consistent with current therapy    CODEINE  Not Detected    MORPHINE  Not Detected    6-ACETYLMORPHINE  Not Detected    OXYCODONE  Not Detected    NOROYXCODONE  Not Detected    OXYMORPHONE  Not Detected    NOROXYMORPHONE  Not Detected    HYDROCODONE  Not Detected    NORHYDROCODONE  Not Detected    HYDROMORPHONE  Not Detected    BUPRENORPHINE  Not Detected    NORUBPRENORPHINE  Not Detected    FENTANYL  Not Detected    NORFENTANYL  Not Detected    MEPERIDINE METABOLITE  Not Detected    TAPENTADOL  Not Detected    TAPENTADOL-O-SULF  Not Detected    METHADONE  Not Detected    TRAMADOL  Not Detected    AMPHETAMINE  Not Detected    METHAMPHETAMINE  Not Detected    MDMA- ECSTASY  Not Detected    MDA  Not Detected    MDEA- Chantel  Not Detected    METHYLPHENIDATE  Not Detected    PHENTERMINE  Not Detected    BENZOYLECGONINE  Present    ALPRAZOLAM  Not Detected    ALPHA-OH-ALPRAZOLAM  Not Detected    CLONAZEPAM  Not Detected    7-AMINOCLONAZEPAM  Not Detected    DIAZEPAM  Not Detected    NORDIAZEPAM  Not Detected    OXAZEPAM  Not Detected    TEMAZEPAM  Not Detected    Lorazepam  Not Detected    MIDAZOLAM  Not Detected    ZOLPIDEM  Not Detected    BARBITURATES  Not Detected    Creatinine, Urine 20.0 - 400.0 mg/dL 89.9    ETHYL GLUCURONIDE  Not Detected    MARIJUANA METABOLITE  Not Detected    PCP  Not Detected    CARISOPRODOL  Not Detected    Comment: The carisoprodol immunoassay has cross-reactivity to   carisoprodol and meprobamate.    Naloxone  Not Detected    Gabapentin  Not Detected    Pregabalin  Not Detected    Alpha-OH-Midazolam  Not Detected    Zolpidem Metabolite  Not Detected

## 2022-08-18 NOTE — TELEPHONE ENCOUNTER
----- Message from Suzanne Marie sent at 8/18/2022  4:49 PM CDT -----  Can the clinic reply in [facility]:no              Please refill the medication(s) listed below. Please call the patient when the prescription(s) is ready for  at this phone [phone]              Medication #1HYDROcodone-acetaminophen (NORCO) 5-325 mg per tablet            Medication #2              Preferred Pharmacy:[facility] #[id number] [place], [place] - [address] AT [facility] [address]

## 2022-08-18 NOTE — TELEPHONE ENCOUNTER
----- Message from Pramod Hurst sent at 8/18/2022  3:51 PM CDT -----  Regarding: REFILL  Who Called:TENZIN DUVALL [[id number]]          RX Name and Strength:HYDROcodone-acetaminophen (NORCO) 5-325 mg per tablet            Is this a 30 day or 90 day RX: 30          Preferred Pharmacy with phone number:  [id number] - [place] - [address] AT [facility] [address]           Local or Mail Order: LOCAL                   SENT TO WRONG ADDRESS

## 2022-08-19 ENCOUNTER — TELEPHONE (OUTPATIENT)
Dept: PAIN MEDICINE | Facility: CLINIC | Age: 72
End: 2022-08-19
Payer: MEDICARE

## 2022-08-19 RX ORDER — HYDROCODONE BITARTRATE AND ACETAMINOPHEN 5; 325 MG/1; MG/1
1 TABLET ORAL EVERY 6 HOURS PRN
Qty: 120 TABLET | Refills: 0 | Status: SHIPPED | OUTPATIENT
Start: 2022-08-19 | End: 2022-09-18

## 2022-08-19 NOTE — TELEPHONE ENCOUNTER
----- Message from Pramod Hurst sent at 8/19/2022 11:00 AM CDT -----  Name of Who is Calling: TENZIN DUVALL [[id number]            What is the request in detail: Patient is requesting call back about update on medicine that was suppose to be sent to new address              Can the clinic reply by [facility]: no              What Number to Call Back if not in [facility]: [phone]

## 2022-08-19 NOTE — TELEPHONE ENCOUNTER
Staff contacted pt regarding returning phone call, pt notified staff that her prescription was sent over to the pharmacy.

## 2022-08-19 NOTE — TELEPHONE ENCOUNTER
----- Message from Lisa Moses sent at 8/19/2022  9:24 AM CDT -----  Regarding: Refill  Who Called: TENZIN DUVALL [[id number]]        Refill or New Rx: Refill        RX Name and Strength HYDROcodone-acetaminophen (NORCO) 5-325 mg per tablet        Is this a 30 day or 90 day RX: 120        [facility] with phone number: [id number] [place] [facility] [address] [facility] [address]   Phone:  [phone]                   Local or Mail Order: Local          Would the patient rather a call back or a response via [facility]?No        Best Call Back Number: [phone]        Additional Information:

## 2022-08-19 NOTE — TELEPHONE ENCOUNTER
"----- Message from Angelique Brunoin sent at 8/19/2022 12:40 PM CDT -----  Regarding: Returning Call              Name of Who is Calling:  Jesika Wyman    Who Left The Message:  Jesika H Garo      What is the request in detail:      Patient called stating, "she's returning the Office's call and would like you to please call again."    Please give a call back at your earliest convenience and further advise.   Thank you!      Reply by [facility]:  No      Preferred Call Back  :  [phone] (K)                                           "

## 2022-08-19 NOTE — TELEPHONE ENCOUNTER
----- Message from Kristie Loco sent at 8/19/2022  2:18 PM CDT -----  Regarding: Corazon Cordoba  Contact: TENZIN DUVALL [[id number]]  Name of Who is Calling:TENZIN DUVALL [[id number]]          What is the request in detail: Pt states the pharmacy wont let her  mediation HYDROcodone-acetaminophen (NORCO) 5-325 mg per tablet,b/c she is also taking sleep medication.  she states they are asking for a prior authorization  for this medication , she states they [employer]urance faxed  over paper already.  . Please advise      She is requesting call back when it is completed       Can the clinic reply by [facility]: No           What Number to Call Back if not in [facility]:[phone]

## 2022-08-22 ENCOUNTER — TELEPHONE (OUTPATIENT)
Dept: SPINE | Facility: CLINIC | Age: 72
End: 2022-08-22
Payer: MEDICARE

## 2022-08-22 NOTE — TELEPHONE ENCOUNTER
Staff spoke with rep to inform him that provider spoke with pt last month. And have left her several voicemails to discuss the results and to D/C from clinic.

## 2022-08-22 NOTE — TELEPHONE ENCOUNTER
----- Message from Angelique Hendrix sent at 8/22/2022 10:37 AM CDT -----  Regarding: Patient Advice            Name of Who is Calling:  [employer]    Who Left The Message:  [employer]        What is the request in detail:       Please give [employer] a call back regarding the medications  QUEtiapine (SEROQUEL) 200 MG Tab  &   HYDROcodone-acetaminophen (NORCO) 5-325 mg per tablet.   Thank you!      [employer] Call Back :  [phone] (Office)    /    ref# [id number]

## 2022-08-25 ENCOUNTER — TELEPHONE (OUTPATIENT)
Dept: PAIN MEDICINE | Facility: CLINIC | Age: 72
End: 2022-08-25
Payer: MEDICARE

## 2022-08-25 NOTE — TELEPHONE ENCOUNTER
----- Message from Fran Sagastume sent at 8/25/2022  2:53 PM CDT -----      Name of Who is Calling: TENZIN DUVALL [[id number]]      What is the request in detail: Pt returned call for results.Please contact to further discuss and advise.          Can the clinic reply by [facility]: N      What [facility] Back [facility]: [phone]

## 2022-08-25 NOTE — TELEPHONE ENCOUNTER
Staff reached out to pt regarding requesting her phone call to be returned. Pt stated someone left voicemail for her, After lreviewing the pt chart staff seen that Syeda left voicemail for the pt to discuss uds results with her.  Staff informed pt that we will let Syeda know that she returned her phone call.

## 2022-08-26 ENCOUNTER — TELEPHONE (OUTPATIENT)
Dept: PAIN MEDICINE | Facility: CLINIC | Age: 72
End: 2022-08-26
Payer: MEDICARE

## 2022-08-26 NOTE — TELEPHONE ENCOUNTER
Pt contacted office regarding message regarding uds results. Pt wants provider to contact her regarding this.

## 2022-08-26 NOTE — TELEPHONE ENCOUNTER
----- Message from Suzanne Marie sent at 8/26/2022  3:50 PM CDT -----  Name of Who is Calling:TENZIN DUVALL [[id number]]              What is the request in detail:Requesting a call back to get urine test results              Can the clinic reply by [facility]:no              What Number to Call Back if not in [facility]:[phone]

## 2022-08-29 ENCOUNTER — TELEPHONE (OUTPATIENT)
Dept: PAIN MEDICINE | Facility: CLINIC | Age: 72
End: 2022-08-29
Payer: MEDICARE

## 2022-08-29 NOTE — TELEPHONE ENCOUNTER
Staff spoke with patient and she was returning a call to nesha. Staff informed patient we will submit a message to nesha to return her call.

## 2022-08-29 NOTE — TELEPHONE ENCOUNTER
----- Message from Elina Gleason sent at 8/29/2022 10:17 AM CDT -----   Type:  Patient Returning Call    Who Called:TENZIN DUVALL     Who Left Message for Patient:RALF Moore    Does the patient know what this is regarding?:    Best Call Back Number:[phone]    Additional Information:

## 2022-08-31 ENCOUNTER — TELEPHONE (OUTPATIENT)
Dept: PAIN MEDICINE | Facility: CLINIC | Age: 72
End: 2022-08-31
Payer: MEDICARE

## 2022-08-31 NOTE — TELEPHONE ENCOUNTER
Discussed with patient that her UDS was positive for cocaine. Patient denied use but states she is frequently exposed. I explained to her that we can no longer prescribe opioids and she verbalized understanding.

## 2022-09-08 ENCOUNTER — TELEPHONE (OUTPATIENT)
Dept: PAIN MEDICINE | Facility: CLINIC | Age: 72
End: 2022-09-08
Payer: MEDICARE

## 2022-09-08 NOTE — TELEPHONE ENCOUNTER
----- Message from Jennifer Sr sent at 9/8/2022  8:27 AM CDT -----  Regarding: results  Name of Who is Calling:TENZIN DUVALL [[id number]]          What is the request in detail: Patient is requesting a call back in reference to drug screening results           Can the clinic reply by [facility]: no          What Number to Call Back if not in [facility]:[phone]

## 2022-09-19 ENCOUNTER — TELEPHONE (OUTPATIENT)
Dept: PAIN MEDICINE | Facility: CLINIC | Age: 72
End: 2022-09-19
Payer: MEDICARE

## 2022-09-19 NOTE — TELEPHONE ENCOUNTER
----- Message from Juvencio Mata MA sent at 9/19/2022  1:23 PM CDT -----    ----- Message -----  From: Rose Simon  Sent: 9/19/2022  12:34 PM CDT  To: Kush Raphael Staff         Type: Patient Returning Call    Who Called: Patient   Who Left Message for Patient: NA    Does the patient know what this is regarding?: Patient has an appointment on 10/20/2022 and is requesting to speak with a supervisor in the clinic. She says she currently has a lawsuit against her pain management provider and needs to see someone different due to a mishap with pain medications and urinary testing. She is requesting to see another provider. Epic redirects scheduling to schedule with her established pain management provider's team.     Would the patient rather a call back or a response via [facility]? Call  Best Call Back Number: [phone]  Additional Information: Please assist, thank you!

## 2022-09-19 NOTE — TELEPHONE ENCOUNTER
"Contacted patient as complaint was escalated about her urine specimen and the result discussion she had with the NP. Ms. Wyman informed manager that the NP called her and discussed her results and she doesn't agree, as she doesn't do drugs, Ms. Wyman stated " anyone who knows me, know I only smoke cigarettes and maybe once or twice a month have a beer. I don't do drugs miss. I have contacted my  gave them that lady name and now I need to see someone else cause I am in so much pain without my medication."    Ms. Wyman was informed that the NP is a NP for all the physicians within the department, unfortunately the specimen results- results are for all the physicians, no one is prescribed the opioid however we can still provide other treatment options.     Ms. Wyman informed manager that she doesn't understand why no one has contacted her since her  was reaching out.     She was informed, if that  reached out to the clinic they would be advised to speak to [facility] legal team.     Ms. Wyman thanked manager for the call back.       "

## 2022-10-19 ENCOUNTER — TELEPHONE (OUTPATIENT)
Dept: PAIN MEDICINE | Facility: CLINIC | Age: 72
End: 2022-10-19
Payer: MEDICARE

## 2022-11-03 ENCOUNTER — TELEPHONE (OUTPATIENT)
Dept: PAIN MEDICINE | Facility: CLINIC | Age: 72
End: 2022-11-03
Payer: MEDICARE

## 2022-11-04 ENCOUNTER — OFFICE VISIT (OUTPATIENT)
Dept: PAIN MEDICINE | Facility: CLINIC | Age: 72
End: 2022-11-04
Payer: MEDICARE

## 2022-11-04 VITALS
TEMPERATURE: 98 F | DIASTOLIC BLOOD PRESSURE: 59 MMHG | RESPIRATION RATE: 18 BRPM | HEART RATE: 90 BPM | BODY MASS INDEX: 14.3 KG/M2 | WEIGHT: 89 LBS | HEIGHT: 66 IN | SYSTOLIC BLOOD PRESSURE: 95 MMHG

## 2022-11-04 DIAGNOSIS — G89.4 CHRONIC PAIN DISORDER: ICD-10-CM

## 2022-11-04 DIAGNOSIS — M48.061 SPINAL STENOSIS OF LUMBAR REGION, UNSPECIFIED WHETHER NEUROGENIC CLAUDICATION PRESENT: Primary | ICD-10-CM

## 2022-11-04 DIAGNOSIS — M41.9 SCOLIOSIS, UNSPECIFIED SCOLIOSIS TYPE, UNSPECIFIED SPINAL REGION: ICD-10-CM

## 2022-11-04 PROCEDURE — 99212 PR OFFICE/OUTPT VISIT, EST, LEVL II, 10-19 MIN: ICD-10-PCS | Mod: S$GLB,,, | Performed by: NURSE PRACTITIONER

## 2022-11-04 PROCEDURE — 1125F AMNT PAIN NOTED PAIN PRSNT: CPT | Mod: CPTII,S$GLB,, | Performed by: NURSE PRACTITIONER

## 2022-11-04 PROCEDURE — 1160F PR REVIEW ALL MEDS BY PRESCRIBER/CLIN PHARMACIST DOCUMENTED: ICD-10-PCS | Mod: CPTII,S$GLB,, | Performed by: NURSE PRACTITIONER

## 2022-11-04 PROCEDURE — 3008F PR BODY MASS INDEX (BMI) DOCUMENTED: ICD-10-PCS | Mod: CPTII,S$GLB,, | Performed by: NURSE PRACTITIONER

## 2022-11-04 PROCEDURE — 1160F RVW MEDS BY RX/DR IN RCRD: CPT | Mod: CPTII,S$GLB,, | Performed by: NURSE PRACTITIONER

## 2022-11-04 PROCEDURE — 3008F BODY MASS INDEX DOCD: CPT | Mod: CPTII,S$GLB,, | Performed by: NURSE PRACTITIONER

## 2022-11-04 PROCEDURE — 3074F PR MOST RECENT SYSTOLIC BLOOD PRESSURE < 130 MM HG: ICD-10-PCS | Mod: CPTII,S$GLB,, | Performed by: NURSE PRACTITIONER

## 2022-11-04 PROCEDURE — 99999 PR PBB SHADOW E&M-EST. PATIENT-LVL III: CPT | Mod: PBBFAC,,, | Performed by: NURSE PRACTITIONER

## 2022-11-04 PROCEDURE — 3288F FALL RISK ASSESSMENT DOCD: CPT | Mod: CPTII,S$GLB,, | Performed by: NURSE PRACTITIONER

## 2022-11-04 PROCEDURE — 99212 OFFICE O/P EST SF 10 MIN: CPT | Mod: S$GLB,,, | Performed by: NURSE PRACTITIONER

## 2022-11-04 PROCEDURE — 1125F PR PAIN SEVERITY QUANTIFIED, PAIN PRESENT: ICD-10-PCS | Mod: CPTII,S$GLB,, | Performed by: NURSE PRACTITIONER

## 2022-11-04 PROCEDURE — 1101F PT FALLS ASSESS-DOCD LE1/YR: CPT | Mod: CPTII,S$GLB,, | Performed by: NURSE PRACTITIONER

## 2022-11-04 PROCEDURE — 1101F PR PT FALLS ASSESS DOC 0-1 FALLS W/OUT INJ PAST YR: ICD-10-PCS | Mod: CPTII,S$GLB,, | Performed by: NURSE PRACTITIONER

## 2022-11-04 PROCEDURE — 3078F DIAST BP <80 MM HG: CPT | Mod: CPTII,S$GLB,, | Performed by: NURSE PRACTITIONER

## 2022-11-04 PROCEDURE — 3078F PR MOST RECENT DIASTOLIC BLOOD PRESSURE < 80 MM HG: ICD-10-PCS | Mod: CPTII,S$GLB,, | Performed by: NURSE PRACTITIONER

## 2022-11-04 PROCEDURE — 1159F MED LIST DOCD IN RCRD: CPT | Mod: CPTII,S$GLB,, | Performed by: NURSE PRACTITIONER

## 2022-11-04 PROCEDURE — 3288F PR FALLS RISK ASSESSMENT DOCUMENTED: ICD-10-PCS | Mod: CPTII,S$GLB,, | Performed by: NURSE PRACTITIONER

## 2022-11-04 PROCEDURE — 3074F SYST BP LT 130 MM HG: CPT | Mod: CPTII,S$GLB,, | Performed by: NURSE PRACTITIONER

## 2022-11-04 PROCEDURE — 99999 PR PBB SHADOW E&M-EST. PATIENT-LVL III: ICD-10-PCS | Mod: PBBFAC,,, | Performed by: NURSE PRACTITIONER

## 2022-11-04 PROCEDURE — 1159F PR MEDICATION LIST DOCUMENTED IN MEDICAL RECORD: ICD-10-PCS | Mod: CPTII,S$GLB,, | Performed by: NURSE PRACTITIONER

## 2022-11-04 NOTE — PROGRESS NOTES
Subjective:      Patient ID: Jesika Wyman is a 71 y.o. female.    Chief Complaint: Hip Pain (B/L)    Referred by: No ref. provider found       Hip Pain      Interval History 11/4/2022:  The patient is here for follow up of back pain. Her UDS from last OV on 7/19/22 was positive for derivatives of cocaine. We spoke on the phone previously about the results and I informed her that we would not continue to prescribe opioid medications for her. She previously said that she hired a  because of this. Today, she again denies use of cocaine. However, she says that her nephew sells it and sometimes lives with her and she is exposed to it. She is frustrated that we will not refill [place]. She reports her pain today is 10/10.    Interval History 7/19/2022:  The patient is here for follow up of chronic back pain secondary to scoliosis. She was evaluated by Dr. Currie at last OV and he assumed care from Dr. Duque who retired. She was decreased from Norco 5/325 mg QID to TID. She says that she is having more pain since decreasing and is requesting to go back to QID. Her pain is primarily across the lower back and is sharp and stabbing in nature. She has some leg pain with walking. Her overall pain today is 10/10.    Initial Encounter:  This is a patient of Dr. Duque who recently retired.  He had her on hydrocodone 5 q.i.d..  She was staying [facility] her [facility] care [facility].  She said these were providing her good relief.  She ran out of medications about a month ago.  She did not have withdrawal symptoms but the pain was severe.  She had significant scoliosis.  She has lumbar spine MRI with multilevel degenerative disease and variable amount of foraminal stenosis and mild central canal stenosis.  MRI was reviewed.  She denied having any bowel or bladder symptomatology.  She has radicular symptoms down the right lower extremity in an L4 or L5 distribution.  She occasionally will feel weakness or give away type  "sensation.  Occasionally she will have tingling and numbness down the right lower extremity.  When I asked her about surgical intervention for her scoliotic curvature and lumbar spine pain she said the surgeon told her it is too late and this should have happened when she was a teenager.  Previous interventional management was once done and cause her more pain than pain relief.  She does not want to try again.  No recent weight changes.  No history of cancer.    Past Medical History:   Diagnosis Date    COPD (chronic obstructive pulmonary disease)     Hemorrhoids     Insomnia        Past Surgical History:   Procedure Laterality Date    HEMORRHOID SURGERY         Review of patient's allergies indicates:   Allergen Reactions    Tramadol Nausea And Vomiting    Ibuprofen Nausea And Vomiting       Current Outpatient Medications   Medication Sig Dispense Refill    albuterol 90 mcg/actuation inhaler Inhale 2 puffs into the lungs every 6 (six) hours as needed for Wheezing. Rescue 1 Inhaler 11    QUEtiapine (SEROQUEL) 200 MG Tab TAKE 1 TABLET BY MOUTH EVERY EVENING 30 tablet 0     No current facility-administered medications for this visit.       Family History   Problem Relation Age of Onset    Hypertension Mother     Cancer Father        Social History     Socioeconomic History    Marital status: Single    Years of education: 11   Tobacco Use    Smoking status: Every Day     Packs/day: 0.25     Years: 35.00     Pack years: 8.75     Types: Cigarettes    Smokeless tobacco: Never   Substance and Sexual Activity    Alcohol use: No     Alcohol/week: 0.0 standard drinks    Drug use: No           ROS        Objective:   BP (!) 95/59   Pulse 90   Temp 98 °F (36.7 °C) (Oral)   Resp 18   Ht 5' 6" (1.676 m)   Wt 40.4 kg (89 lb)   BMI 14.36 kg/m²   Pain Disability Index Review:  Last 3 PDI Scores 11/4/2022 7/19/2022 4/19/2022   Pain Disability Index (PDI) 36 50 63     Normocephalic.  Atraumatic.  Affect appropriate.  Breathing " unlabored.  Extra ocular muscles intact.         PREVIOUS PHYSICAL EXAM      General Musculoskeletal Exam   Gait: normal     Right Ankle/Foot Exam     Tests   Heel Walk: able to perform  Tiptoe Walk: able to perform    Left Ankle/Foot Exam     Tests   Heel Walk: able to perform  Tiptoe Walk: able to perform      Right Hip Exam     Range of Motion   External rotation:  normal   Internal rotation:  normal     Tests   Pain w/ forced internal rotation (SINDHU): absent  Kym: negative    Other   Sensation: normal  Left Hip Exam     Range of Motion   External rotation:  normal   Internal rotation: normal     Tests   Pain w/ forced internal rotation (SINDHU): absent  Kym: negative    Other   Sensation: normal      Back (L-Spine & T-Spine) / Neck (C-Spine) Exam     Back (L-Spine & T-Spine) Range of Motion   Extension:  abnormal Back extension: Limited.  Scoliotic curvature noted.  Flexion:  abnormal Back flexion: Limited scoliotic curvature noted.  Lateral bend right:  abnormal   Lateral bend left:  abnormal     Back (L-Spine & T-Spine) Tests   Right Side Tests  Femoral Stretch: negative  Left Side Tests  Femoral Stretch: negative    Comments:  FACET LOADING:  Positive bilaterally.    Positive pain with lumbar extension and flexion.  Positive facet loading bilaterally.  Notable scoliosis.        Muscle Strength   Right Lower Extremity   Hip Flexion: 5/5   Quadriceps:  5/5   Hamstrin/5   Gastrocsoleus:  5/5   EHL:  5/5  Left Lower Extremity   Hip Flexion: 5/5   Quadriceps:  5/5   Hamstrin/5   Gastrocsoleus:  5/5   EHL:  5/5    Reflexes     Left Side  Babinski Sign:  absent  Ankle Clonus:  absent    Right Side   Babinski Sign:  absent  Ankle Clonus:  absent    Vascular Exam       Capillary Refill  Right Hand: normal capillary refill  Left Hand: normal capillary refill        Edema  Right Upper Leg: absent  Left Upper Leg: absent      Assessment:       Encounter Diagnoses   Name Primary?    Spinal stenosis of lumbar  region, unspecified whether neurogenic claudication present Yes    Scoliosis, unspecified scoliosis type, unspecified spinal region     Chronic pain disorder            Plan:   We discussed with the patient the assessment and recommendations. The following is the plan we agreed on:    - Previous imaging was reviewed. Pain is consistent with scoliosis.    - Discussed again with patient that we cannot prescribe opioid medications due to her previous UDS being positive for cocaine derivatives. Patient was frustrated but polite.    - I offered to continue to treat her with non-controlled medications and interventional options which she declined.    - Should she change her mind about above options, she may return at any time.        The above plan and management options were discussed at length with patient. Patient is in agreement with the above and verbalized understanding.     RALF Ramon  11/04/2022

## 2023-02-07 ENCOUNTER — TELEPHONE (OUTPATIENT)
Dept: PAIN MEDICINE | Facility: CLINIC | Age: 73
End: 2023-02-07
Payer: MEDICARE

## 2023-02-07 NOTE — TELEPHONE ENCOUNTER
Patient states that her primacy care Doctor sent an referral to see our pain management provider.     However patient says that she is not coming back to this clinic.    Verbalized understanding

## 2023-02-07 NOTE — TELEPHONE ENCOUNTER
----- Message from Suzanne Marie sent at 2/7/2023  3:55 PM CST -----  Name of Who is Calling:TENZIN DUVALL [[id number]]              What is the request in detail:Requesting a call back.               Can the clinic reply by [facility]:              What Number [facility] Call Back [facility]:[phone]

## 2024-06-10 LAB
CHOLEST SERPL-MSCNC: 148 MG/DL (ref 0–200)
HDLC SERPL-MCNC: 49 MG/DL (ref 35–70)
LDLC SERPL CALC-MCNC: 84 MG/DL (ref 0–160)
TRIGL SERPL-MCNC: 98 MG/DL (ref 40–160)

## 2024-07-08 ENCOUNTER — OFFICE VISIT (OUTPATIENT)
Dept: PRIMARY CARE CLINIC | Facility: CLINIC | Age: 74
End: 2024-07-08
Payer: MEDICARE

## 2024-07-08 VITALS
BODY MASS INDEX: 16.33 KG/M2 | TEMPERATURE: 99 F | RESPIRATION RATE: 18 BRPM | HEIGHT: 66 IN | OXYGEN SATURATION: 92 % | HEART RATE: 80 BPM | DIASTOLIC BLOOD PRESSURE: 78 MMHG | SYSTOLIC BLOOD PRESSURE: 138 MMHG | WEIGHT: 101.63 LBS

## 2024-07-08 DIAGNOSIS — J44.9 STAGE 2 MODERATE COPD BY GOLD CLASSIFICATION: ICD-10-CM

## 2024-07-08 DIAGNOSIS — I73.9 PVD (PERIPHERAL VASCULAR DISEASE): ICD-10-CM

## 2024-07-08 DIAGNOSIS — F41.8 DEPRESSION WITH ANXIETY: ICD-10-CM

## 2024-07-08 DIAGNOSIS — F17.200 TOBACCO DEPENDENCE: ICD-10-CM

## 2024-07-08 DIAGNOSIS — G47.01 INSOMNIA DUE TO MEDICAL CONDITION: Primary | ICD-10-CM

## 2024-07-08 DIAGNOSIS — K20.90 ESOPHAGITIS: ICD-10-CM

## 2024-07-08 PROCEDURE — 1125F AMNT PAIN NOTED PAIN PRSNT: CPT | Mod: CPTII,,, | Performed by: FAMILY MEDICINE

## 2024-07-08 PROCEDURE — 3008F BODY MASS INDEX DOCD: CPT | Mod: CPTII,,, | Performed by: FAMILY MEDICINE

## 2024-07-08 PROCEDURE — 1101F PT FALLS ASSESS-DOCD LE1/YR: CPT | Mod: CPTII,,, | Performed by: FAMILY MEDICINE

## 2024-07-08 PROCEDURE — 1160F RVW MEDS BY RX/DR IN RCRD: CPT | Mod: CPTII,,, | Performed by: FAMILY MEDICINE

## 2024-07-08 PROCEDURE — 99204 OFFICE O/P NEW MOD 45 MIN: CPT | Mod: ,,, | Performed by: FAMILY MEDICINE

## 2024-07-08 PROCEDURE — 3075F SYST BP GE 130 - 139MM HG: CPT | Mod: CPTII,,, | Performed by: FAMILY MEDICINE

## 2024-07-08 PROCEDURE — 3078F DIAST BP <80 MM HG: CPT | Mod: CPTII,,, | Performed by: FAMILY MEDICINE

## 2024-07-08 PROCEDURE — 3288F FALL RISK ASSESSMENT DOCD: CPT | Mod: CPTII,,, | Performed by: FAMILY MEDICINE

## 2024-07-08 PROCEDURE — 1159F MED LIST DOCD IN RCRD: CPT | Mod: CPTII,,, | Performed by: FAMILY MEDICINE

## 2024-07-08 RX ORDER — VARENICLINE TARTRATE 0.5 (11)-1
KIT ORAL
Qty: 1 EACH | Refills: 0 | Status: SHIPPED | OUTPATIENT
Start: 2024-07-08

## 2024-07-08 RX ORDER — QUETIAPINE FUMARATE 200 MG/1
200 TABLET, FILM COATED ORAL NIGHTLY
Qty: 30 TABLET | Refills: 3 | Status: SHIPPED | OUTPATIENT
Start: 2024-07-08

## 2024-07-08 RX ORDER — MIRTAZAPINE 15 MG/1
15 TABLET, FILM COATED ORAL NIGHTLY
Qty: 30 TABLET | Refills: 5 | Status: SHIPPED | OUTPATIENT
Start: 2024-07-08 | End: 2025-07-08

## 2024-07-08 RX ORDER — HYDROCODONE BITARTRATE AND ACETAMINOPHEN 10; 325 MG/1; MG/1
1 TABLET ORAL EVERY 6 HOURS PRN
COMMUNITY

## 2024-07-08 RX ORDER — BUDESONIDE, GLYCOPYRROLATE, AND FORMOTEROL FUMARATE 160; 9; 4.8 UG/1; UG/1; UG/1
2 AEROSOL, METERED RESPIRATORY (INHALATION) 2 TIMES DAILY
Qty: 5.9 G | Refills: 5 | Status: SHIPPED | OUTPATIENT
Start: 2024-07-08

## 2024-07-08 NOTE — ASSESSMENT & PLAN NOTE
Upper scope discussion due to pain and food and liquid getting stuck.  Referral to GI for assistance.

## 2024-07-08 NOTE — PROGRESS NOTES
Family Medicine    Patient ID: [id number]     Chief Complaint: Establish Care      HPI:     Jesika Wyman is a 73 y.o. female here today to establish care. Multiple chronic problems.  Sees pain management. Reports issues with food getting stuck when she eats or drinks.  Hx of PVD and was told she needed stints.  Also would like to quit smoking.      Past Medical History:   Diagnosis Date    COPD (chronic obstructive pulmonary disease)     Depression     Hemorrhoids     Insomnia     Scoliosis         Past Surgical History:   Procedure Laterality Date    HEMORRHOID SURGERY          Social History     Tobacco Use    Smoking status: Every Day     Current packs/day: 0.25     Average packs/day: 0.3 packs/day for 35.0 years (8.8 ttl pk-yrs)     Types: Cigarettes    Smokeless tobacco: Never   Substance and Sexual Activity    Alcohol use: No     Alcohol/week: 0.0 standard drinks of alcohol    Drug use: No    Sexual activity: Not on file        Current Outpatient Medications   Medication Instructions    albuterol 90 mcg/actuation inhaler 2 puffs, Inhalation, Every 6 hours PRN, Rescue    budesonide-glycopyr-formoterol (BREZTRI AEROSPHERE) 160-9-4.8 mcg/actuation HFAA 2 puffs, Inhalation, 2 times daily    HYDROcodone-acetaminophen (NORCO)  mg per tablet 1 tablet, Oral, Every 6 hours PRN    mirtazapine (REMERON) 15 mg, Oral, Nightly    QUEtiapine (SEROQUEL) 200 mg, Oral, Nightly    varenicline (CHANTIX STARTING MONTH BOX) 0.5 mg (11)- 1 mg (42) tablet Take one 0.5mg tab by mouth once daily X3 days,then increase to one 0.5mg tab twice daily X4 days,then increase to one 1mg tab twice daily       Review of patient's allergies indicates:   Allergen Reactions    Tramadol Nausea And Vomiting    Ibuprofen Nausea And Vomiting        Patient Care Team:  Emilie Billings MD as PCP - General (Family Medicine)  Stephen Larsen Jr., MD as PCP - Internal Medicine (Internal Medicine)  Renay Nieves MA as Care Coordinator     Subjective:  "    Review of Systems    12 point review of systems conducted, negative except as stated in the history of present illness. See HPI for details.    Objective:     Visit Vitals  /78 (BP Location: Right arm, Patient Position: Sitting, BP Method: Large (Automatic))   Pulse 80   Temp 99 °F (37.2 °C)   Resp 18   Ht 5' 6" (1.676 m)   Wt 46.1 kg (101 lb 9.6 oz)   SpO2 (!) 92%   BMI 16.40 kg/m²       Physical Exam  Vitals and nursing note reviewed.   Constitutional:       General: She is not in acute distress.     Appearance: She is not diaphoretic.      Comments: Thin, frail female   HENT:      Mouth/Throat:      Mouth: Mucous membranes are moist.   Cardiovascular:      Rate and Rhythm: Normal rate and regular rhythm.   Pulmonary:      Effort: Pulmonary effort is normal.      Breath sounds: Normal breath sounds.   Neurological:      General: No focal deficit present.      Mental Status: She is alert.   Psychiatric:         Mood and Affect: Mood normal.       Labs Reviewed:     Chemistry:  Lab Results   Component Value Date     06/29/2016    K 3.5 06/29/2016    BUN 12 06/29/2016    CREATININE 0.8 06/29/2016    CALCIUM 9.2 06/29/2016    ALKPHOS 53 (L) 06/29/2016    ALBUMIN 3.6 06/29/2016    AST 20 06/29/2016    ALT 14 06/29/2016    TSH 2.660 06/29/2016        No results found for: "HGBA1C", "MICROALBCREA"     Hematology:  Lab Results   Component Value Date    WBC 6.10 01/04/2016    HGB 14.0 01/04/2016    HCT 43.5 01/04/2016     (H) 01/04/2016       Lipid Panel:  Lab Results   Component Value Date    CHOL 215 (H) 01/04/2016    HDL 75 01/04/2016    TRIG 47 01/04/2016    TOTALCHOLEST 2.9 01/04/2016        Urine:  Lab Results   Component Value Date    CREATRANDUR 89.9 07/19/2022        Assessment:       ICD-10-CM ICD-9-CM   1. Insomnia due to medical condition  G47.01 327.01   2. PVD (peripheral vascular disease)  I73.9 443.9   3. Esophagitis  K20.90 530.10   4. Stage 2 moderate COPD by GOLD classification  " J44.9 496   5. Tobacco dependence  F17.200 305.1   6. Depression with anxiety  F41.8 300.4          Plan:     1. Insomnia due to medical condition  Assessment & Plan:  Trial of Remeron. Take just before bedtime.     Orders:  -     QUEtiapine (SEROQUEL) 200 MG Tab; Take 1 tablet (200 mg total) by mouth every evening.  Dispense: 30 tablet; Refill: 3  -     mirtazapine (REMERON) 15 MG tablet; Take 1 tablet (15 mg total) by mouth every evening.  Dispense: 30 tablet; Refill: 5    2. PVD (peripheral vascular disease)  Assessment & Plan:  Referral to Vascular Surgery.     Orders:  -     Ambulatory referral/consult to Vascular Surgery; Future; Expected date: 07/15/2024    3. Esophagitis  Assessment & Plan:  Upper scope discussion due to pain and food and liquid getting stuck.  Referral to GI for assistance.      Orders:  -     Ambulatory referral/consult to Gastroenterology; Future; Expected date: 07/15/2024    4. Stage 2 moderate COPD by GOLD classification  Assessment & Plan:  Trial of Breztri.      Orders:  -     budesonide-glycopyr-formoterol (BREZTRI AEROSPHERE) 160-9-4.8 mcg/actuation HFAA; Inhale 2 puffs into the lungs 2 (two) times a day.  Dispense: 5.9 g; Refill: 5    5. Tobacco dependence  Assessment & Plan:  Chantix order sent.  Side effects discussed.       6. Depression with anxiety  Assessment & Plan:  Refill of Seroquel sent.       Other orders  -     varenicline (CHANTIX STARTING MONTH BOX) 0.5 mg (11)- 1 mg (42) tablet; Take one 0.5mg tab by mouth once daily X3 days,then increase to one 0.5mg tab twice daily X4 days,then increase to one 1mg tab twice daily  Dispense: 1 each; Refill: 0           Follow up in about 3 months (around 10/8/2024). In addition to their scheduled follow up, the patient has also been instructed to follow up on as needed basis.     Future Appointments   Date Time Provider Department Center   10/8/2024  8:00 AM [facility]  [facility]

## 2024-08-21 ENCOUNTER — PATIENT OUTREACH (OUTPATIENT)
Facility: CLINIC | Age: 74
End: 2024-08-21
Payer: MEDICARE

## 2024-08-21 DIAGNOSIS — Z12.11 COLON CANCER SCREENING: ICD-10-CM

## 2024-08-21 DIAGNOSIS — M81.0 OSTEOPOROSIS, UNSPECIFIED OSTEOPOROSIS TYPE, UNSPECIFIED PATHOLOGICAL FRACTURE PRESENCE: ICD-10-CM

## 2024-08-21 DIAGNOSIS — Z12.31 ENCOUNTER FOR SCREENING MAMMOGRAM FOR MALIGNANT NEOPLASM OF BREAST: Primary | ICD-10-CM

## 2024-08-21 NOTE — ADDENDUM NOTE
[facility] Internal Medicine   Progress Note              Subjective:  Patient in and examined by me.  Intubated sedated.  Potassium improved 3.8  Sodium 147  Hgb 10.7     14 point Review of systems cannot be performed due to patient's mental status    I/O's    Intake/Output Summary (Last 24 hours) at 2021 1224  Last data filed at 2021 0859  Gross per 24 hour   Intake 3887.37 ml   Output 4150 ml   Net -262.63 ml         ALLERGIES:  Patient has no known allergies.     Arterial Line MAP (mmHg)  Av.4 mmHg  Min: 68 mmHg  Max: 87 mmHg  No data recorded          HOSPITAL MEDS  • potassium CHLORIDE  60 mEq Per NG tube Q6H   • sodium chloride (PF)  10 mL Injection 2 times per day   • tamsulosin  0.4 mg Oral Daily PC   • gabapentin  100 mg Oral Q8H   • guaifenesin  200 mg Oral Q4H   • isosorbide dinitrate  40 mg Per NG tube Q8H   • ARIPiprazole  7.5 mg Per NG tube Nightly   • pantoprazole  40 mg Per NG tube Daily   • hydrALAZINE  100 mg Per G Tube 4 times per day   • melatonin  6 mg Oral Nightly   • polyethylene glycol  17 g Per NG tube Daily   • labetalol  600 mg Oral Q6H   • docusate sodium  100 mg Per NG tube BID   • [Held by provider] insulin glargine  35 Units Subcutaneous 2 times per day   • cloNIDine  0.3 mg Oral 3 times per day   • insulin lispro   Subcutaneous 4 times per day   • amLODIPine  10 mg Oral Daily   • sennosides  5 mL Per NG tube BID   • OXcarbazepine  600 mg Oral 2 times per day   • escitalopram  10 mg Per NG tube Daily       • sodium chloride 0.9% infusion     • sodium chloride 0.9% infusion     • sodium chloride 0.9% infusion     • propofol (DIPRIVAN) infusion 75 mcg/kg/min (21 1205)   • furosemide (LASIX) 500 mg/50 mL infusion 10 mg/hr (21 0659)   • niCARdipine (CARDENE) 125 mg/250 mL in dextrose 5% concentrated infusion Stopped (21 1806)   • dextrose 5 % infusion Stopped (21 0247)   • dexMEDETomidine (PRECEDEX) 400 mcg/100 mL in sodium chloride 0.9 %  Addended by: WILMER WILD on: 8/21/2024 09:28 AM     Modules accepted: Orders     infusion Stopped (21 1030)   • dextrose 5 % infusion 75 mL/hr at 21 0659   • AMIODarone (CORDARONE) 450 mg/250 mL dextrose 5% infusion     • lidocaine (XYLOCAINE) 2,000 mg/500 mL in dextrose 5 % infusion     • sodium chloride 0.9% infusion 3 mL/hr at 21 0202       sodium chloride, sodium chloride, potassium CHLORIDE 40 mEq/250 mL NS IVPB, sodium chloride, MIDAZolam, sodium chloride (PF), traZODone, ALPRAZolam, dextrose, HYDROcodone-acetaminophen, magnesium sulfate, dexmedetomidine (PRECEDEX) infusion, hydrALAZINE, potassium CHLORIDE, hydrALAZINE, acetaminophen, morphine injection, fentaNYL, ondansetron **OR** ondansetron, AMIODarone (CARDARONE) bolus IVPB, [] AMIODarone (CORDARONE) infusion **FOLLOWED BY** AMIODarone (CORDARONE) infusion, lidocaine (XYLOCAINE) infusion, docusate sodium-sennosides, magnesium hydroxide, sodium biphosphate, calcium gluconate in sodium chloride 0.9% IVPB (customizable for apheresis), lidocaine (cardiac), sodium bicarbonate, [DISCONTINUED] chlorhexidine gluconate **AND** chlorhexidine gluconate, dextrose, dextrose, glucagon, dextrose, dextrose, bisacodyl       Last Recorded Vitals      SpO2 Readings from Last 3 Encounters:   21 97%        VITAL SIGNS:     Vital Last Value 24 Hour Range   Temperature 98.8 °F (37.1 °C) (21 0800) Temp  Min: 98.5 °F (36.9 °C)  Max: 99 °F (37.2 °C)   Pulse 64 (21 0900) Pulse  Min: 62  Max: 98   Respiratory (!) 27 (21 0900) Resp  Min: 0  Max: 27   Non-Invasive  Blood Pressure 108/58 (21 0630) BP  Min: 106/57  Max: 137/75   Pulse Oximetry 97 % (21 1058) SpO2  Min: 96 %  Max: 99 %   Arterial   Blood Pressure 129/71 (21 0900) Arterial Line BP  Min: 99/57  Max: 134/68      Vital Today Admitted   Weight 129.2 kg (284 lb 13.4 oz) (05/10/21 2100) Weight: 123 kg (271 lb 2.7 oz) (21 154)   Height N/A Height: 6' (182.9 cm) (21 1546)   BMI N/A BMI (Calculated): 36.78 (21)             Physical Exam:  General: Not alert or oriented, sedated, no acute distress  Eyes: no scleral icterus, no conjunctival erythema   Cardio: S1, S2, RRR, no murmur, rub, gallop or thrills noted.   Pulm: Lungs diminished to auscultation bilaterally, no wheeze or rhonchi noted.  Consistent with ventilator.  GI: Soft, non-tender, less distended.  Obese. + bowel sounds auscultated x4 quadrants  : No suprapubic Tenderness, + Hutchinson with dark yellow urine  Ext: No upper or lower extremity edema noted. No cords palpated.   Musculoskeletal: Cannot assess strength both upper and lower extremities.   Skin: No abnormal bruising or discoloration noted. No jaundice.   Psych:sCannot assess mood and affect. no Insight and Judgment  Neuro: pt does not follow commands at this time  No significant change in exam    Labs   Recent Labs     05/12/21  1015 05/12/21 2017 05/13/21  0304 05/13/21  0633 05/13/21  1203   WBC 12.8*  --  11.4*  --  10.7   RBC 4.05*  --  3.58*  --  3.83*   HGB 11.4* 10.6* 10.2* 10.7* 10.7*   HCT 36.6* 34.3* 32.7* 34.1* 34.4*    154 164  --  177   MCV 90.4  --  91.3  --  89.8   MCH 28.1  --  28.5  --  27.9   MCHC 31.1*  --  31.2*  --  31.1*   NRBCRE 0  --  0  --  0         Recent Labs     05/11/21  0308 05/12/21  0306 05/12/21  2255 05/13/21  0303 05/13/21  0304 05/13/21  0633   SODIUM 147* 148*  --   --  147*  --    POTASSIUM 2.8* 4.2 3.5  --  3.8 3.7   MG 2.1 2.2 2.4  --  2.3 2.3   PHOS 2.8 4.3  --   --  8.3*  --    CO2 36* 29  --   --  29  --    ANIONGAP 10 12  --   --  12  --    GLUCOSE 90 118*  --   --  87  --    BUN 19 28*  --   --  34*  --    CREATININE 0.60* 0.70  --   --  0.59*  --    BCRAT 32* 40*  --   --  58*  --    CALCIUM 9.8 9.7  --   --  9.1  --    BILIRUBIN 2.1* 1.4*  --   --  0.9  --    AST 39* 32  --   --  25  --    GPT 30 27  --   --  20  --    ALKPT 76 83  --   --  73  --    GLOB 3.9 4.7*  --   --  4.0  --    AGR 0.9* 0.7*  --   --  1.0  --    * 417*  --   --  403*  --    FERR  1,882* 1,972*  --   --  1,619*  --    DDIMER >35.20* 32.02*  --  12.30*  --   --    CRP 12.0*  --   --   --  18.0*  --         Recent Labs   Lab 05/13/21  0633 05/13/21  0304 05/12/21  2255 05/12/21  0306 05/11/21  0308   SODIUM  --  147*  --  148* 147*   POTASSIUM 3.7 3.8 3.5 4.2 2.8*   CHLORIDE  --  110*  --  111* 104   CO2  --  29  --  29 36*   BUN  --  34*  --  28* 19   CREATININE  --  0.59*  --  0.70 0.60*   GLUCOSE  --  87  --  118* 90   ALBUMIN  --  3.8  --  3.2* 3.5*   PHOS  --  8.3*  --  4.3 2.8   AST  --  25  --  32 39*   BILIRUBIN  --  0.9  --  1.4* 2.1*   CPK  --  219  --  80 83       Recent Labs     05/11/21  0308 05/13/21  0304   PCT 0.26* 0.53*        Recent Labs   Lab 05/13/21  0304   NTPROB 240*        Recent Labs     05/11/21  0308 05/12/21  0306 05/13/21  0303   INR 1.3 1.2 1.1   PT 13.5* 12.7* 11.9*   PTT 26 26 28       Imaging:   Repetitive; all current imaging reviewed and interpreted by me.   Please see previous notes for previous imaging    Cultures  Microbiology Results  (Last 10 results in the past 7 days)    Specimen   Gram Smear   Culture Result   Status       05/10/21  4891         No organisms seen.[P]          Rare Polymorphonuclear cells.[P]            All imaging, labs, vitals and related tests have been reviewed and interpreted by me.     Assessment/Plan:    SARS-CoV-2 (+) pneumonia   -Completed 5 days of Decadron initially presented to another facilityinitially presented to another hospital  - received 5 days decadron 20mg daily  - was not a candidate for remdesivir per physicians evaluating at that time    ARDS due to COVID-19 infection  - s/p VV ECMO 2/21/21  - s/p [facility] ECMO 3/5/21  - since extubated but then re-intubated  - s/p VATS 5/9/21    Ventilator-associated bacterial pneumonia-resolved as of 4/17/2021  MSSA pneumonia and bacteremia  Treated with Ancef; completed    Coagulopathy  - pt with bleeding; oral bleeding resulted in [facility] ECMO needs  - D-dimer followed  - if bleeding  stable eat least     Massive hemoptysis-resolved as of 4/23/2021  3/5/2021 status post bronchoscopy and cryotherapy and cautery     Epistaxis-resolved as of 4/23/2021 4/8/2021 status post microsphere embolization bilateral sphenopalatine arteries    Mediastinal hematoma  -3/12/2021 had V. fib with cardiac tamponade after [facility] ECMO  -Sternotomy opened; diffuse soft tissue noted to be bleeding controlled with cautery  -Chest closed on 3/16/2021 with sternal plate and bilateral chest tubes  -Status post VATS on 5/10/2021    Fluid Overload  - furosemide drip continues  - resultant hypokalemia  - cont replacement per protocol    Hypokalemia - resolved  - replacement being given  - labs pending  - magnesium WNL  - hypokalemia improved; cont monitor    Hypernatremia  -- 148 --> 147    Acute Hypoxic Respiratory Failure  Multifactorial; ARDS, fluid overload, bleeding  - goal O2 saturation >92%  - Patient does not use any oxygen at baseline  - currently on     Acute kidney injury secondary to ATN  -CRRT was stopped on 3/29/2021  -Creatinine at that time normalized  - At this point likely secondary to muscle wasting  - Most recent creatinine of 0.6    Hypertension   - currently controlled  - continue home meds with parameters  - avoid hypotension    Paroxysmal atrial fibrillation -resolved  -Converted back to sinus rhythm with amiodarone drip  -Continue to monitor    Oropharyngeal dysphagia  -Patient therapy following, however patient currently sedated  -Continue with diet as per speech therapy    Deconditioning and Debility  - PT/OT ordered; pending recs  -Patient still recovering not appropriate for placement    Pressure Injury   - deep tissue of dorsum L foot  - cont monitor    Morbid Obesity BMI>40  - weight loss counseling will be provided when pt able to comprehend    Anxiety  - on lexapro    Normocytic Anemia  - hemoglobin 11.9  - pt with various sites of active bleeding and chest tubes with bloody output  - cont  monitor      DVT Prophylaxis: contraindicated at this time due to bleeding  Diet: tube feeds and supplements  Baseline Activity: Ambulates Independently    CODE STATUS: FULL CODE    Code Status: Full Resuscitation    Physician Notification:  Consultants notified of patient via [facility].  Communication: with patient, nurse     MORE than 35 MINS WERE SPENT ON THIS PATIENTS CARE TODAY, more than 50% of which was spent coordinating patient care. This includes endering the following: Reviewed all vitals, medications, new orders, I/O, labs, micro, radiology, nurses notes, pertinent consultant notes which are reflected in assessment and plan.This does not include time spent on other items of care such as smoking cessation counseling, prolonged care time, and or advanced care planning if applicable.     Due to the development of diagnoses during acute hospital stay, H&P and progress notes will not be shared in real time with the patient to ensure that proper explanations and reviews can be done with the patient. Pre-mature information may sometimes be harmful to the patient if not relayed in a compassionate and well timed manner, and most often are best relayed by the physician. No information is withheld from the patient or family, and all documentation may be requested after patient is discharged once diagnoses and conditions have been confirmed. All discharge summaries with final diagnoses and workups as well as follow ups will be available to the patient via [facility] unless otherwise appropriate.     Primary Care Physician  No Pcp      Julia Dover MD  [facility] Hospitalist  5/13/2021 12:24 PM

## 2024-08-21 NOTE — PROGRESS NOTES
Health Maintenance Topic(s) Outreach Outcomes & Actions Taken:  Chart review, Call to patient no answer left message regarding the following, Portal message sent.  States Lipids done at [facility].    Colorectal Cancer Screening - Outreach Outcomes & Actions Taken  : ordered.    Breast Cancer Screening - Outreach Outcomes & Actions Taken  : Ordered  [place]   [facility]     Osteoporosis Screening - Outreach Outcomes & Actions Taken  : Ordered     Primary Care Appt - Outreach Outcomes & Actions Taken  : PCP Appointment 10/08/2024       Additional Notes:    SDOH , High risk smoker, reviewed 7/2024       Care Management, Digital Medicine, and/or Education Referrals

## 2024-08-21 NOTE — PROGRESS NOTES
Spoke with patient, states she was told she would get a box in the mail for Colon screening, order not found.   Cologuard ordered today.  [facility],  EGD scheduled 9/25/24 at [facility].  Lipids uploaded.

## 2024-08-21 NOTE — LETTER
Dear Lois [facility] is committed to your overall health. Periodically we review the health information in your chart to make sure you are up to date on all of your recommended tests and/or procedures.       Our review of your chart shows that you may be due for the following:      VBHM Score: 3     Colon Cancer Screening  Osteoporosis Screening  Mammogram    Shingles/Zoster Vaccine  RSV Vaccine          If you have had any of the above done at another facility, please let us know and we will request a copy of the report to update your [facility] record.       At your convenience I would like to speak to you to help get these items scheduled (if needed) and also see if there is anything else we can do to help you. Please send me a message via your patient portal or give me a call at [phone].  I am looking forward to speaking with you soon.     Sincerely, HAROON Wilkins Care Coordinator    Emilie Billings MD and your [facility] Primary Care Team

## 2024-09-13 ENCOUNTER — HOSPITAL ENCOUNTER (OUTPATIENT)
Dept: RADIOLOGY | Facility: HOSPITAL | Age: 74
Discharge: HOME | End: 2024-09-13
Attending: FAMILY MEDICINE
Payer: MEDICARE

## 2024-09-13 DIAGNOSIS — Z12.31 ENCOUNTER FOR SCREENING MAMMOGRAM FOR BREAST CANCER: ICD-10-CM

## 2024-09-13 DIAGNOSIS — Z78.0 POSTMENOPAUSE: ICD-10-CM

## 2024-09-13 PROCEDURE — 77067 SCR MAMMO BI INCL CAD: CPT | Mod: TC

## 2024-09-13 PROCEDURE — 77081 DXA BONE DENSITY APPENDICULR: CPT | Mod: TC

## 2024-09-13 PROCEDURE — 77080 DXA BONE DENSITY AXIAL: CPT | Mod: XU,TC

## 2024-09-13 PROCEDURE — 77063 BREAST TOMOSYNTHESIS BI: CPT | Mod: TC

## 2024-09-25 ENCOUNTER — ANESTHESIA (OUTPATIENT)
Dept: ENDOSCOPY | Facility: HOSPITAL | Age: 74
End: 2024-09-25
Payer: MEDICARE

## 2024-09-25 ENCOUNTER — HOSPITAL ENCOUNTER (OUTPATIENT)
Facility: HOSPITAL | Age: 74
Discharge: HOME | End: 2024-09-25
Attending: INTERNAL MEDICINE | Admitting: INTERNAL MEDICINE
Payer: MEDICARE

## 2024-09-25 ENCOUNTER — ANESTHESIA EVENT (OUTPATIENT)
Dept: ENDOSCOPY | Facility: HOSPITAL | Age: 74
End: 2024-09-25
Payer: MEDICARE

## 2024-09-25 VITALS
HEART RATE: 87 BPM | DIASTOLIC BLOOD PRESSURE: 76 MMHG | HEIGHT: 66 IN | OXYGEN SATURATION: 100 % | BODY MASS INDEX: 16.88 KG/M2 | TEMPERATURE: 97 F | SYSTOLIC BLOOD PRESSURE: 118 MMHG | WEIGHT: 105 LBS | RESPIRATION RATE: 20 BRPM

## 2024-09-25 DIAGNOSIS — Z12.11 SCREEN FOR COLON CANCER: ICD-10-CM

## 2024-09-25 DIAGNOSIS — R13.14 DYSPHAGIA, PHARYNGOESOPHAGEAL PHASE: ICD-10-CM

## 2024-09-25 PROCEDURE — 37000008 HC ANESTHESIA 1ST 15 MINUTES: Performed by: INTERNAL MEDICINE

## 2024-09-25 PROCEDURE — 88305 TISSUE EXAM BY PATHOLOGIST: CPT | Mod: 59 | Performed by: INTERNAL MEDICINE

## 2024-09-25 PROCEDURE — 63600175 PHARM REV CODE 636 W HCPCS

## 2024-09-25 PROCEDURE — 43249 ESOPH EGD DILATION <30 MM: CPT | Performed by: INTERNAL MEDICINE

## 2024-09-25 PROCEDURE — 27201423 OPTIME MED/SURG SUP & DEVICES STERILE SUPPLY: Performed by: INTERNAL MEDICINE

## 2024-09-25 PROCEDURE — 37000009 HC ANESTHESIA EA ADD 15 MINS: Performed by: INTERNAL MEDICINE

## 2024-09-25 PROCEDURE — 43239 EGD BIOPSY SINGLE/MULTIPLE: CPT | Mod: 59 | Performed by: INTERNAL MEDICINE

## 2024-09-25 PROCEDURE — 88313 SPECIAL STAINS GROUP 2: CPT

## 2024-09-25 PROCEDURE — 25000003 PHARM REV CODE 250: Performed by: ANESTHESIOLOGY

## 2024-09-25 PROCEDURE — C1726 CATH, BAL DIL, NON-VASCULAR: HCPCS | Performed by: INTERNAL MEDICINE

## 2024-09-25 PROCEDURE — 88342 IMHCHEM/IMCYTCHM 1ST ANTB: CPT

## 2024-09-25 PROCEDURE — 25000003 PHARM REV CODE 250

## 2024-09-25 RX ORDER — LIDOCAINE HYDROCHLORIDE 10 MG/ML
INJECTION, SOLUTION EPIDURAL; INFILTRATION; INTRACAUDAL; PERINEURAL
Status: DISCONTINUED | OUTPATIENT
Start: 2024-09-25 | End: 2024-09-25

## 2024-09-25 RX ORDER — PROPOFOL 10 MG/ML
VIAL (ML) INTRAVENOUS
Status: COMPLETED
Start: 2024-09-25 | End: 2024-09-25

## 2024-09-25 RX ORDER — SODIUM CHLORIDE, SODIUM LACTATE, POTASSIUM CHLORIDE, CALCIUM CHLORIDE 600; 310; 30; 20 MG/100ML; MG/100ML; MG/100ML; MG/100ML
INJECTION, SOLUTION INTRAVENOUS CONTINUOUS
Status: DISCONTINUED | OUTPATIENT
Start: 2024-09-25 | End: 2024-09-25 | Stop reason: HOSPADM

## 2024-09-25 RX ORDER — ONDANSETRON HYDROCHLORIDE 2 MG/ML
4 INJECTION, SOLUTION INTRAVENOUS DAILY PRN
Status: DISCONTINUED | OUTPATIENT
Start: 2024-09-25 | End: 2024-09-25 | Stop reason: HOSPADM

## 2024-09-25 RX ORDER — SODIUM CHLORIDE, SODIUM GLUCONATE, SODIUM ACETATE, POTASSIUM CHLORIDE AND MAGNESIUM CHLORIDE 30; 37; 368; 526; 502 MG/100ML; MG/100ML; MG/100ML; MG/100ML; MG/100ML
INJECTION, SOLUTION INTRAVENOUS CONTINUOUS
Status: DISCONTINUED | OUTPATIENT
Start: 2024-09-25 | End: 2024-09-25 | Stop reason: HOSPADM

## 2024-09-25 RX ORDER — PROPOFOL 10 MG/ML
VIAL (ML) INTRAVENOUS CONTINUOUS PRN
Status: DISCONTINUED | OUTPATIENT
Start: 2024-09-25 | End: 2024-09-25

## 2024-09-25 RX ORDER — GABAPENTIN 600 MG/1
600 TABLET ORAL 3 TIMES DAILY
COMMUNITY

## 2024-09-25 RX ORDER — LIDOCAINE HYDROCHLORIDE 10 MG/ML
INJECTION, SOLUTION EPIDURAL; INFILTRATION; INTRACAUDAL; PERINEURAL
Status: COMPLETED
Start: 2024-09-25 | End: 2024-09-25

## 2024-09-25 RX ORDER — SODIUM CHLORIDE 9 MG/ML
INJECTION, SOLUTION INTRAVENOUS CONTINUOUS
Status: DISCONTINUED | OUTPATIENT
Start: 2024-09-25 | End: 2024-09-25 | Stop reason: HOSPADM

## 2024-09-25 RX ADMIN — PROPOFOL 100 MCG/KG/MIN: 10 INJECTION, EMULSION INTRAVENOUS at 09:09

## 2024-09-25 RX ADMIN — PROPOFOL 50 MG: 10 INJECTION, EMULSION INTRAVENOUS at 09:09

## 2024-09-25 RX ADMIN — SODIUM CHLORIDE, SODIUM GLUCONATE, SODIUM ACETATE, POTASSIUM CHLORIDE AND MAGNESIUM CHLORIDE: 526; 502; 368; 37; 30 INJECTION, SOLUTION INTRAVENOUS at 09:09

## 2024-09-25 RX ADMIN — SODIUM CHLORIDE, SODIUM GLUCONATE, SODIUM ACETATE, POTASSIUM CHLORIDE AND MAGNESIUM CHLORIDE 10 ML/HR: 526; 502; 368; 37; 30 INJECTION, SOLUTION INTRAVENOUS at 08:09

## 2024-09-25 RX ADMIN — LIDOCAINE HYDROCHLORIDE 50 MG: 10 INJECTION, SOLUTION EPIDURAL; INFILTRATION; INTRACAUDAL; PERINEURAL at 09:09

## 2024-09-25 NOTE — PROVATION PATIENT INSTRUCTIONS
Discharge Summary/Instructions after an Endoscopic Procedure  Patient Name: Jesika Wyman  Patient MRN: [id number]  Patient YOB: 1950 Wednesday, September 25, 2024  Shawn Ohara MD  Dear patient,  As a result of recent federal legislation (The Federal Cures Act), you may   receive lab or pathology results from your procedure in your [facility]   account before your physician is able to contact you. Your physician or   their representative will relay the results to you with their   recommendations at their soonest availability.  Thank you,  RESTRICTIONS:  During your procedure today, you received medications for sedation.  These   medications may affect your judgment, balance and coordination.  Therefore,   for 24 hours, you have the following restrictions:   - DO NOT drive a car, operate machinery, make legal/financial decisions,   sign important papers or drink alcohol.    ACTIVITY:  Today: no heavy lifting, straining or running due to procedural   sedation/anesthesia.  The following day: return to full activity including work.  DIET:  Eat and drink normally unless instructed otherwise.     TREATMENT FOR COMMON SIDE EFFECTS:  - Mild abdominal pain, nausea, belching, bloating or excessive gas:  rest,   eat lightly and use a heating pad.  - Sore Throat: treat with throat lozenges and/or gargle with warm salt   water.  - Because air was used during the procedure, expelling large amounts of air   from your rectum or belching is normal.  - If a bowel prep was taken, you may not have a bowel movement for 1-3 days.    This is normal.  SYMPTOMS TO WATCH FOR AND REPORT TO YOUR PHYSICIAN:  1. Abdominal pain or bloating, other than gas cramps.  2. Chest pain.  3. Back pain.  4. Signs of infection such as: chills or fever occurring within 24 hours   after the procedure.  5. Rectal bleeding, which would show as bright red, maroon, or black stools.   (A tablespoon of blood from the rectum is not serious,  especially if   hemorrhoids are present.)  6. Vomiting.  7. Weakness or dizziness.  GO DIRECTLY TO THE NEAREST EMERGENCY ROOM IF YOU HAVE ANY OF THE FOLLOWING:      Difficulty breathing              Chills and/or fever over 101 F   Persistent vomiting and/or vomiting blood   Severe abdominal pain   Severe chest pain   Black, tarry stools   Bleeding- more than one tablespoon   Any other symptom or condition that you feel may need urgent attention  Your doctor recommends these additional instructions:  If any biopsies were taken, your doctors clinic will contact you in 1 to 2   weeks with any results.  - cont ppi   -Await pathology results.   - Discharge patient to home.   - Resume previous diet today.   - Continue present medications.  For questions, problems or results please call your physician - Shawn Ohara MD at Work:  [phone].  [facility] ED at [phone]  IF A COMPLICATION OR EMERGENCY SITUATION ARISES AND YOU ARE UNABLE TO REACH   YOUR PHYSICIAN - GO DIRECTLY TO THE EMERGENCY ROOM.  MD Shawn Clements MD  9/25/2024 9:30:27 AM  This report has been verified and signed electronically.  Dear patient,  As a result of recent federal legislation (The Federal Cures Act), you may   receive lab or pathology results from your procedure in your [facility]   account before your physician is able to contact you. Your physician or   their representative will relay the results to you with their   recommendations at their soonest availability.  Thank you,  PROVATION

## 2024-09-25 NOTE — H&P
History & Physical      HPI:    73-year-old woman with depression, COPD on home oxygen, peripheral vascular disease, tobacco use here for EGD.  I saw her in clinic for GERD and dysphagia.  She was having some mild solid food dysphagia.  She has never had an EGD before.  Her sister has swallowing problems, but no family history of esophageal cancer.  She is on PPI that we started from clinic.  Whole lot of improvement with the PPI.      PCP:    Emilie Billings MD        Review of patient's allergies indicates:   Allergen Reactions    Tramadol Nausea And Vomiting    Ibuprofen Nausea And Vomiting            Current Facility-Administered Medications   Medication Dose Route Frequency Provider Last Rate Last Admin    propofol (DIPRIVAN) 10 mg/mL IVP injection             0.9%  NaCl infusion   Intravenous Continuous Rudi Ortega MD        electrolyte-A infusion   Intravenous Continuous Rudi Ortega MD 10 mL/hr at 09/25/24 0847 10 mL/hr at 09/25/24 0847    lactated ringers infusion   Intravenous Continuous Rudi Ortega MD           Medications Prior to Admission   Medication Sig Dispense Refill Last Dose    gabapentin (NEURONTIN) 600 MG tablet Take 600 mg by mouth 3 (three) times daily.   9/24/2024 at 0800    HYDROcodone-acetaminophen (NORCO)  mg per tablet Take 1 tablet by mouth every 6 (six) hours as needed.   9/24/2024    QUEtiapine (SEROQUEL) 200 MG Tab Take 1 tablet (200 mg total) by mouth every evening. 30 tablet 3 9/24/2024    albuterol 90 mcg/actuation inhaler Inhale 2 puffs into the lungs every 6 (six) hours as needed for Wheezing. Rescue 1 Inhaler 11 9/23/2024    budesonide-glycopyr-formoterol (BREZTRI AEROSPHERE) 160-9-4.8 mcg/actuation HFAA Inhale 2 puffs into the lungs 2 (two) times a day. 5.9 g 5 9/23/2024    mirtazapine (REMERON) 15 MG tablet Take 1 tablet (15 mg total) by mouth every evening. 30 tablet 5 Unknown    varenicline (CHANTIX STARTING MONTH BOX) 0.5 mg (11)- 1 mg (42) tablet  Take one 0.5mg tab by mouth once daily X3 days,then increase to one 0.5mg tab twice daily X4 days,then increase to one 1mg tab twice daily 1 each 0 Unknown           Past Medical History:    Past Medical History:   Diagnosis Date    COPD (chronic obstructive pulmonary disease)     Depression     Hemorrhoids     Insomnia     Scoliosis         Past Surgical History:    Past Surgical History:   Procedure Laterality Date    HEMORRHOID SURGERY          Family History:    Family History   Problem Relation Name Age of Onset    Hypertension Mother      Cancer Father         Social History:    Social History     Tobacco Use    Smoking status: Every Day     Current packs/day: 0.25     Average packs/day: 0.3 packs/day for 35.0 years (8.8 ttl pk-yrs)     Types: Cigarettes    Smokeless tobacco: Never   Substance Use Topics    Alcohol use: No     Alcohol/week: 0.0 standard drinks of alcohol            Review of Systems:    Review of Systems    Objective:            VITAL SIGNS: 24 HR MIN & MAX    LAST    Temp  Min: 97 °F (36.1 °C)  Max: 97 °F (36.1 °C)    97 °F (36.1 °C)    BP  Min: 125/76  Max: 125/76    125/76    Pulse  Min: 86  Max: 86    86    Resp  Min: 21  Max: 21    (!) 21    SpO2  Min: 100 %  Max: 100 %    100 % (room air)        No intake or output data in the 24 hours ending 09/25/24 0904        Physical Exam    Gen: NAD.  Alert and Oriented x 3  HENT: normocephalic, atraumatic.    CV: s1s2  Lungs: ctab  Abd: soft.nt.nd +bs. No ttp          No results found for this or any previous visit (from the past 48 hour(s)).        DXA Bone Density Axial Skeleton 1 or more sites    Result Date: 9/13/2024  EXAMINATION: DXA BONE DENSITY AXIAL SKELETON 1 OR MORE SITES; DEXA BONE DENSITY APPENDICULAR SKELETON CLINICAL HISTORY: Encounter for screening mammogram for malignant neoplasm of breast TECHNIQUE: DXA scanning of the lumbar spine, left forearm and hips. COMPARISON: No relevant comparison studies available. FINDINGS: Mean bone  mineral density from L1 through L2 is 0.810 g/sq cm with a T score of -3.0. Left femoral neck bone mineral density is 0.613 g/sq cm with a T score of -3.1. Left total femoral bone mineral density is 0.628 g/sq cm with a T score of -3.0. Right femoral neck bone mineral density is 0.674 g/sq cm with a T score of -2.6. Right total femoral bone mineral density is 0.617 g/sq cm with a T score of -3.1. Left 1/3 radial bone mineral density is 0.475 g per square cm with T-score -4.6. Based on the left femoral bone mineral density, the FRAX 10 year probability of a major osteoporotic fracture is 8.6%.  For a hip fracture, the 10 year probability is 4.7%.     Osteoporosis. Electronically signed by: Rodolfo Lacy Date:    09/13/2024 Time:    13:20    DXA Bone Density Appendicular Skeleton    Result Date: 9/13/2024  EXAMINATION: DXA BONE DENSITY AXIAL SKELETON 1 OR MORE SITES; DEXA BONE DENSITY APPENDICULAR SKELETON CLINICAL HISTORY: Encounter for screening mammogram for malignant neoplasm of breast TECHNIQUE: DXA scanning of the lumbar spine, left forearm and hips. COMPARISON: No relevant comparison studies available. FINDINGS: Mean bone mineral density from L1 through L2 is 0.810 g/sq cm with a T score of -3.0. Left femoral neck bone mineral density is 0.613 g/sq cm with a T score of -3.1. Left total femoral bone mineral density is 0.628 g/sq cm with a T score of -3.0. Right femoral neck bone mineral density is 0.674 g/sq cm with a T score of -2.6. Right total femoral bone mineral density is 0.617 g/sq cm with a T score of -3.1. Left 1/3 radial bone mineral density is 0.475 g per square cm with T-score -4.6. Based on the left femoral bone mineral density, the FRAX 10 year probability of a major osteoporotic fracture is 8.6%.  For a hip fracture, the 10 year probability is 4.7%.     Osteoporosis. Electronically signed by: Rodolfo Lacy Date:    09/13/2024 Time:    13:20                Assessment & Plan:     Dysphagia    Plan  for EGD with dilation.  Risk of bleeding and perforation discussed with patient's

## 2024-09-25 NOTE — TRANSFER OF CARE
"Anesthesia Transfer of Care Note    Patient: Jesika Wyman    Procedure(s) Performed: Procedure(s) (LRB):  EGD (N/A)  EGD, WITH CLOSED BIOPSY (N/A)  EGD, WITH BALLOON DILATION OF LESS THAN 30 MILLIMETERS (N/A)    Patient location: GI    Anesthesia Type: general    Transport from OR: Transported from OR on room air with adequate spontaneous ventilation    Post pain: adequate analgesia    Post assessment: no apparent anesthetic complications and tolerated procedure well    Post vital signs: stable    Level of consciousness: awake    Nausea/Vomiting: no nausea/vomiting    Complications: none    Transfer of care protocol was followed    Last vitals: Visit Vitals  /76 (BP Location: Left arm, Patient Position: Sitting)   Pulse 86   Temp 36.1 °C (97 °F) (Tympanic)   Resp (!) 21   Ht 5' 6" (1.676 m)   Wt 47.6 kg (105 lb)   SpO2 100%   Breastfeeding No   BMI 16.95 kg/m²     "

## 2024-09-25 NOTE — ANESTHESIA PREPROCEDURE EVALUATION
"                                                                                                             09/25/2024  Jesika Wyman is a 73 y.o., female.  Pre-operative evaluation for Procedure(s) (LRB):  EGD (N/A)  EGD, WITH CLOSED BIOPSY (N/A)    Ht 5' 6" (1.676 m)   Wt 47.6 kg (105 lb)   BMI 16.95 kg/m²     Past Medical History:   Diagnosis Date    COPD (chronic obstructive pulmonary disease)     Depression     Hemorrhoids     Insomnia     Scoliosis        Patient Active Problem List   Diagnosis    Chronic back pain    Scoliosis    Insomnia due to medical condition    Stage 2 moderate COPD by GOLD classification    Subclinical hypothyroidism    Dental caries    Periodontal disease    Delayed wound healing    Allergic rhinitis    Alveolar ridge abnormality    DDD (degenerative disc disease), lumbar    Lumbar canal stenosis    Bulging lumbar disc    Facet arthropathy, lumbar    Depression with anxiety    Osteoporosis    Hep C w/o coma, chronic    Cough    Tobacco dependence    Cachexia    Esophagitis    PVD (peripheral vascular disease)       Review of patient's allergies indicates:   Allergen Reactions    Tramadol Nausea And Vomiting    Ibuprofen Nausea And Vomiting       Current Outpatient Medications   Medication Instructions    albuterol 90 mcg/actuation inhaler 2 puffs, Inhalation, Every 6 hours PRN, Rescue    budesonide-glycopyr-formoterol (BREZTRI AEROSPHERE) 160-9-4.8 mcg/actuation HFAA 2 puffs, Inhalation, 2 times daily    HYDROcodone-acetaminophen (NORCO)  mg per tablet 1 tablet, Oral, Every 6 hours PRN    mirtazapine (REMERON) 15 mg, Oral, Nightly    QUEtiapine (SEROQUEL) 200 mg, Oral, Nightly    varenicline (CHANTIX STARTING MONTH BOX) 0.5 mg (11)- 1 mg (42) tablet Take one 0.5mg tab by mouth once daily X3 days,then increase to one 0.5mg tab twice daily X4 days,then increase to one 1mg tab twice daily       Past Surgical History:   Procedure Laterality Date    HEMORRHOID SURGERY           Lab " "Results   Component Value Date    WBC 6.10 01/04/2016    HGB 14.0 01/04/2016    HCT 43.5 01/04/2016    MCV 84 01/04/2016     (H) 01/04/2016          BMP  Lab Results   Component Value Date     06/29/2016    K 3.5 06/29/2016    CO2 30 (H) 06/29/2016    BUN 12 06/29/2016    CREATININE 0.8 06/29/2016    CALCIUM 9.2 06/29/2016    ANIONGAP 6 (L) 06/29/2016    ESTGFRAFRICA >60.0 06/29/2016    EGFRNONAA >60.0 06/29/2016        INR  No results for input(s): "PT", "INR", "PROTIME", "APTT" in the last 72 hours.        Diagnostic Studies:      EKG:  No results found for this or any previous visit.    No results found for this or any previous visit.            Pre-op Assessment    I have reviewed the Patient Summary Reports.    I have reviewed the NPO Status.   I have reviewed the Medications.     Review of Systems  Anesthesia Hx:  No problems with previous Anesthesia             Denies Family Hx of Anesthesia complications.    Denies Personal Hx of Anesthesia complications.                    Cardiovascular:  Cardiovascular Normal                   No Cardiac Complaints                         Pulmonary:  Pulmonary Normal        No Pulmonary Complaints               Hepatic/GI:        No Current GERD Sx              Physical Exam  General: Alert and Oriented    Airway:  Mallampati: II   Mouth Opening: Normal  TM Distance: Normal  Tongue: Normal  Neck ROM: Normal ROM    Dental:  Edentulous    Chest/Lungs:  Clear to auscultation, Normal Respiratory Rate    Heart:  Rate: Normal  Rhythm: Regular Rhythm        Anesthesia Plan  Type of Anesthesia, risks & benefits discussed:    Anesthesia Type: Gen Natural Airway  Intra-op Monitoring Plan: Standard ASA Monitors  Post Op Pain Control Plan: multimodal analgesia  Induction:  IV  Airway Plan: Direct  Informed Consent: Informed consent signed with the Patient and all parties understand the risks and agree with anesthesia plan.  All questions answered.   ASA Score: 3  Day of " Surgery Review of History & Physical: H&P Update referred to the surgeon/provider.  Anesthesia Plan Notes: Nasal cannula vs facemask supplemental oxygenation   For patients with NEREIDA/obesity, may consider SuperNoval Nasal CPAP      Poss conversion to General LMA/VICKEY discussed          Ready For Surgery From Anesthesia Perspective.     .

## 2024-09-25 NOTE — ANESTHESIA POSTPROCEDURE EVALUATION
Anesthesia Post Evaluation    Patient: Jesika Wyman    Procedure(s) Performed: Procedure(s) (LRB):  EGD (N/A)  EGD, WITH CLOSED BIOPSY (N/A)  EGD, WITH BALLOON DILATION OF LESS THAN 30 MILLIMETERS (N/A)    Final Anesthesia Type: general      Patient location during evaluation: PACU  Patient participation: Yes- Able to Participate  Level of consciousness: awake  Post-procedure vital signs: reviewed and stable  Pain management: adequate  Airway patency: patent    PONV status at discharge: vomiting (controlled) and nausea (controlled)  Anesthetic complications: no      Cardiovascular status: hemodynamically stable  Respiratory status: spontaneous ventilation and unassisted  Hydration status: euvolemic  Follow-up not needed.  Comments:                  Vitals Value Taken Time   /76 09/25/24 0938   Temp 36.1 °C (97 °F) 09/25/24 0938   Pulse 87 09/25/24 0938   Resp 20 09/25/24 0938   SpO2 100 % 09/25/24 0938         No case tracking events are documented in the log.      Pain/Whitley Score: Whitley Score: 10 (9/25/2024  9:38 AM)

## 2024-09-26 LAB — PSYCHE PATHOLOGY RESULT: NORMAL

## 2024-10-16 NOTE — PROGRESS NOTES
Family Medicine    Patient ID: [id number]     Chief Complaint: Follow-up and Back Pain      HPI:     Jesika Wyman is a 73 y.o. female here today for a follow up.   Had EGD done since last visit with dilation and significant GERD.  Swallowing is better but still having a stuck sensation.  Appetite is slightly better.    Concern for low back pain.  Seeing pain management Dr. Mckeon in [place] and getting oxy and gabapentin.  No new trauma.      Osteo: new dx, discussed starting fosamax, vit D and calcium    Anxiety: pt states she has palpitations when sitting, no new CP or SOB or MARCELO, no leg cramping but occ pains to the legs when back pain flairs up.  Daughter is concerned about pt sleeping all day and night for over 2 days.  Concerned about the Seroquel being too strong.  Interested in trying something different.     Past Medical History:   Diagnosis Date    COPD (chronic obstructive pulmonary disease)     Depression     Hemorrhoids     Insomnia     Scoliosis         Past Surgical History:   Procedure Laterality Date    EGD, WITH BALLOON DILATION OF LESS THAN 30 MILLIMETERS N/A 9/25/2024    Procedure: EGD, WITH BALLOON DILATION OF LESS THAN 30 MILLIMETERS;  Surgeon: Shawn Ohara MD;  Location: [facility] ENDOSCOPY;  Service: Gastroenterology;  Laterality: N/A;  CRRE Balloon Dilation 12 mm to 15mm    EGD, WITH CLOSED BIOPSY N/A 9/25/2024    Procedure: EGD, WITH CLOSED BIOPSY;  Surgeon: Shawn Ohara MD;  Location: [facility] ENDOSCOPY;  Service: Gastroenterology;  Laterality: N/A;    ESOPHAGOGASTRODUODENOSCOPY N/A 9/25/2024    Procedure: EGD;  Surgeon: Shawn Ohara MD;  Location: [facility] ENDOSCOPY;  Service: Gastroenterology;  Laterality: N/A;    HEMORRHOID SURGERY          Social History     Tobacco Use    Smoking status: Every Day     Current packs/day: 0.25     Average packs/day: 0.3 packs/day for 35.0 years (8.8 ttl pk-yrs)     Types: Cigarettes    Smokeless tobacco: Never  "  Substance and Sexual Activity    Alcohol use: No     Alcohol/week: 0.0 standard drinks of alcohol    Drug use: No    Sexual activity: Not Currently        Current Outpatient Medications   Medication Instructions    albuterol 90 mcg/actuation inhaler 2 puffs, Inhalation, Every 6 hours PRN, Rescue    alendronate (FOSAMAX) 70 mg, Oral, Every 7 days    budesonide-glycopyr-formoterol (BREZTRI AEROSPHERE) 160-9-4.8 mcg/actuation HFAA 2 puffs, Inhalation, 2 times daily    gabapentin (NEURONTIN) 600 mg, 3 times daily    HYDROcodone-acetaminophen (NORCO)  mg per tablet 1 tablet, Every 6 hours PRN    mirtazapine (REMERON) 15 mg, Oral, Nightly    paroxetine (PAXIL) 20 mg, Oral, Every morning    varenicline (CHANTIX STARTING MONTH BOX) 0.5 mg (11)- 1 mg (42) tablet Take one 0.5mg tab by mouth once daily X3 days,then increase to one 0.5mg tab twice daily X4 days,then increase to one 1mg tab twice daily       Review of patient's allergies indicates:   Allergen Reactions    Tramadol Nausea And Vomiting    Ibuprofen Nausea And Vomiting        Patient Care Team:  Emilie Billings MD as PCP - General (Family Medicine)  Stephen Larsen Jr., MD as PCP - Internal Medicine (Internal Medicine)  Ariella Osman LPN as Care Coordinator     Subjective:     Review of Systems    12 point review of systems conducted, negative except as stated in the history of present illness. See HPI for details.    Objective:     Visit Vitals  /76 (BP Location: Right arm, Patient Position: Sitting)   Pulse 83   Resp 17   Ht 5' 6" (1.676 m)   Wt 50.8 kg (112 lb)   SpO2 95%   BMI 18.08 kg/m²       Physical Exam  Vitals and nursing note reviewed.   Constitutional:       Comments: Thin female     HENT:      Mouth/Throat:      Mouth: Mucous membranes are moist.   Cardiovascular:      Rate and Rhythm: Normal rate and regular rhythm.   Pulmonary:      Effort: Pulmonary effort is normal.      Breath sounds: Normal breath sounds.   Neurological:      " "General: No focal deficit present.      Mental Status: She is alert.   Psychiatric:         Mood and Affect: Mood normal.       Labs Reviewed:     Chemistry:  Lab Results   Component Value Date     06/29/2016    K 3.5 06/29/2016    BUN 12 06/29/2016    CREATININE 0.8 06/29/2016    CALCIUM 9.2 06/29/2016    ALKPHOS 53 (L) 06/29/2016    ALBUMIN 3.6 06/29/2016    AST 20 06/29/2016    ALT 14 06/29/2016    TSH 2.660 06/29/2016        No results found for: "HGBA1C", "MICROALBCREA"     Hematology:  Lab Results   Component Value Date    WBC 6.10 01/04/2016    HGB 14.0 01/04/2016    HCT 43.5 01/04/2016     (H) 01/04/2016       Lipid Panel:  Lab Results   Component Value Date    CHOL 148 06/10/2024    HDL 49 06/10/2024    TRIG 98 06/10/2024    TOTALCHOLEST 2.9 01/04/2016        Urine:  Lab Results   Component Value Date    CREATRANDUR 89.9 07/19/2022        Assessment:       ICD-10-CM ICD-9-CM   1. Degeneration of intervertebral disc of lumbar region, unspecified whether pain present  M51.369 722.52   2. Spinal stenosis of lumbar region, unspecified whether neurogenic claudication present  M48.061 724.02   3. Hep C w/o coma, chronic  B18.2 070.54   4. Cachexia  R64 799.4   5. Osteoporosis, unspecified osteoporosis type, unspecified pathological fracture presence  M81.0 733.00   6. Anxiety  F41.9 300.00   7. Depression with anxiety  F41.8 300.4   8. Tobacco dependence  F17.200 305.1        Plan:     1. Degeneration of intervertebral disc of lumbar region, unspecified whether pain present  -     Ambulatory referral/consult to Pain Clinic; Future; Expected date: 10/24/2024    2. Spinal stenosis of lumbar region, unspecified whether neurogenic claudication present  Overview:  Referral to pain management.     Orders:  -     Ambulatory referral/consult to Pain Clinic; Future; Expected date: 10/24/2024    3. Hep C w/o coma, chronic  Overview:  Plan for repeat testing in the next 6 months.       4. " Cachexia  Overview:  Continue additional protein drinks between meals, continue Remeron.        5. Osteoporosis, unspecified osteoporosis type, unspecified pathological fracture presence  Overview:  Take fosamax once a week, stay upright for at least 30 minutes after taking it.  Take vitamin D and Calcium.    Wait 2 weeks before starting since we are changing to the Paxil medication.     Orders:  -     alendronate (FOSAMAX) 70 MG tablet; Take 1 tablet (70 mg total) by mouth every 7 days.  Dispense: 12 tablet; Refill: 3    6. Anxiety  -     paroxetine (PAXIL) 20 MG tablet; Take 1 tablet (20 mg total) by mouth every morning.  Dispense: 90 tablet; Refill: 3    7. Depression with anxiety  Overview:  Stop Seroquel. Start Paxil at bedtime.        8. Tobacco dependence  Overview:  Did not take Chantix prescribed.  Declined medication today.            Follow up in about 2 weeks (around 10/31/2024). In addition to their scheduled follow up, the patient has also been instructed to follow up on as needed basis.     Future Appointments   Date Time Provider Department Center   10/31/2024 10:00 AM Emilie Billings MD [facility] Jeremy Billings MD

## 2024-10-17 ENCOUNTER — OFFICE VISIT (OUTPATIENT)
Dept: PRIMARY CARE CLINIC | Facility: CLINIC | Age: 74
End: 2024-10-17
Payer: MEDICARE

## 2024-10-17 ENCOUNTER — TELEPHONE (OUTPATIENT)
Dept: PRIMARY CARE CLINIC | Facility: CLINIC | Age: 74
End: 2024-10-17

## 2024-10-17 VITALS
DIASTOLIC BLOOD PRESSURE: 76 MMHG | WEIGHT: 112 LBS | BODY MASS INDEX: 18 KG/M2 | HEART RATE: 83 BPM | HEIGHT: 66 IN | SYSTOLIC BLOOD PRESSURE: 119 MMHG | RESPIRATION RATE: 17 BRPM | OXYGEN SATURATION: 95 %

## 2024-10-17 DIAGNOSIS — M81.0 OSTEOPOROSIS, UNSPECIFIED OSTEOPOROSIS TYPE, UNSPECIFIED PATHOLOGICAL FRACTURE PRESENCE: ICD-10-CM

## 2024-10-17 DIAGNOSIS — B18.2 HEP C W/O COMA, CHRONIC: ICD-10-CM

## 2024-10-17 DIAGNOSIS — F41.8 DEPRESSION WITH ANXIETY: ICD-10-CM

## 2024-10-17 DIAGNOSIS — F41.9 ANXIETY: ICD-10-CM

## 2024-10-17 DIAGNOSIS — R64 CACHEXIA: ICD-10-CM

## 2024-10-17 DIAGNOSIS — M48.061 SPINAL STENOSIS OF LUMBAR REGION, UNSPECIFIED WHETHER NEUROGENIC CLAUDICATION PRESENT: ICD-10-CM

## 2024-10-17 DIAGNOSIS — F17.200 TOBACCO DEPENDENCE: ICD-10-CM

## 2024-10-17 DIAGNOSIS — M51.369 DEGENERATION OF INTERVERTEBRAL DISC OF LUMBAR REGION, UNSPECIFIED WHETHER PAIN PRESENT: Primary | ICD-10-CM

## 2024-10-17 PROCEDURE — 3008F BODY MASS INDEX DOCD: CPT | Mod: CPTII,,, | Performed by: FAMILY MEDICINE

## 2024-10-17 PROCEDURE — 1160F RVW MEDS BY RX/DR IN RCRD: CPT | Mod: CPTII,,, | Performed by: FAMILY MEDICINE

## 2024-10-17 PROCEDURE — 1125F AMNT PAIN NOTED PAIN PRSNT: CPT | Mod: CPTII,,, | Performed by: FAMILY MEDICINE

## 2024-10-17 PROCEDURE — 3288F FALL RISK ASSESSMENT DOCD: CPT | Mod: CPTII,,, | Performed by: FAMILY MEDICINE

## 2024-10-17 PROCEDURE — 3078F DIAST BP <80 MM HG: CPT | Mod: CPTII,,, | Performed by: FAMILY MEDICINE

## 2024-10-17 PROCEDURE — 3074F SYST BP LT 130 MM HG: CPT | Mod: CPTII,,, | Performed by: FAMILY MEDICINE

## 2024-10-17 PROCEDURE — 1101F PT FALLS ASSESS-DOCD LE1/YR: CPT | Mod: CPTII,,, | Performed by: FAMILY MEDICINE

## 2024-10-17 PROCEDURE — 1159F MED LIST DOCD IN RCRD: CPT | Mod: CPTII,,, | Performed by: FAMILY MEDICINE

## 2024-10-17 PROCEDURE — 99214 OFFICE O/P EST MOD 30 MIN: CPT | Mod: ,,, | Performed by: FAMILY MEDICINE

## 2024-10-17 RX ORDER — ALENDRONATE SODIUM 70 MG/1
70 TABLET ORAL
Qty: 12 TABLET | Refills: 3 | Status: SHIPPED | OUTPATIENT
Start: 2024-10-17 | End: 2025-10-17

## 2024-10-17 RX ORDER — PAROXETINE HYDROCHLORIDE 20 MG/1
20 TABLET, FILM COATED ORAL EVERY MORNING
Qty: 90 TABLET | Refills: 3 | Status: SHIPPED | OUTPATIENT
Start: 2024-10-17 | End: 2025-10-17

## 2024-10-17 NOTE — TELEPHONE ENCOUNTER
----- Message from Leticia sent at 10/16/2024  9:54 AM CDT -----  Who Called: Jesika Wyman    Does the patient already have the specialty appointment scheduled?:no  If yes, what is the date of that appointment?:none  Referral to What Specialty:pain management   ##(request for [facility])##  Reason for Referral:scoliosis  Does the patient want the referral with a specific physician?:no  If yes, which provider?:   [facility]    Preferred Method of Contact: Phone Call  Patient's Preferred Phone Number on File: [phone]   Best Call Back Number, if different:  Additional Information:  referral request, please advise, thanks

## 2024-10-21 ENCOUNTER — TELEPHONE (OUTPATIENT)
Dept: PRIMARY CARE CLINIC | Facility: CLINIC | Age: 74
End: 2024-10-21
Payer: MEDICARE

## 2024-10-21 DIAGNOSIS — F41.9 ANXIETY: ICD-10-CM

## 2024-10-21 RX ORDER — PAROXETINE HYDROCHLORIDE 20 MG/1
20 TABLET, FILM COATED ORAL EVERY MORNING
Qty: 90 TABLET | Refills: 3 | Status: SHIPPED | OUTPATIENT
Start: 2024-10-21 | End: 2024-10-23 | Stop reason: SDUPTHER

## 2024-10-21 NOTE — TELEPHONE ENCOUNTER
----- Message from Margot sent at 10/21/2024  2:49 PM CDT -----  Regarding: med refill  .Who Called: Jesika Wyman    Refill or New Rx:Refill  RX Name and Strength:paroxetine (PAXIL) 20 MG tablet  How is the patient currently taking it? (ex. 1XDay):1xday  Is this a 30 day or 90 day RX:90  Local or Mail Order:local  List of preferred pharmacies on file (remove unneeded): [id number] - [place], [place] - [address]   Phone: [phone]  Fax:[phone]  Ordering Provider:Manuelito      Preferred Method of Contact: Phone Call  Patient's Preferred Phone Number on File: [phone]   Best Call Back Number, if different:  Additional Information: pt needs authorization from  to refill prescription

## 2024-10-22 ENCOUNTER — TELEPHONE (OUTPATIENT)
Dept: PRIMARY CARE CLINIC | Facility: CLINIC | Age: 74
End: 2024-10-22
Payer: MEDICARE

## 2024-10-22 NOTE — TELEPHONE ENCOUNTER
----- Message from Valerie sent at 10/22/2024  3:03 PM CDT -----  .Type:  Patient Returning Call    Who Called:pt  Who Left Message for Patient:pt  Does the patient know what this is regarding?:paroxetine (PAXIL) 20 MG tablet  Would the patient rather a call back or a response via [facility]?   Best Call Back Number:[phone]  Additional Information: Please send prior authorization for paroxetine (PAXIL) 20 MG tablet  Please call when this has been done

## 2024-10-23 DIAGNOSIS — F41.9 ANXIETY: ICD-10-CM

## 2024-10-23 RX ORDER — PAROXETINE HYDROCHLORIDE 20 MG/1
20 TABLET, FILM COATED ORAL EVERY MORNING
Qty: 90 TABLET | Refills: 3 | Status: SHIPPED | OUTPATIENT
Start: 2024-10-23 | End: 2025-10-23

## 2024-10-28 ENCOUNTER — PATIENT OUTREACH (OUTPATIENT)
Facility: CLINIC | Age: 74
End: 2024-10-28
Payer: MEDICARE

## 2024-10-30 ENCOUNTER — TELEPHONE (OUTPATIENT)
Dept: PRIMARY CARE CLINIC | Facility: CLINIC | Age: 74
End: 2024-10-30
Payer: MEDICARE

## 2024-10-30 DIAGNOSIS — F17.200 TOBACCO DEPENDENCE: Primary | ICD-10-CM

## 2024-10-30 RX ORDER — VARENICLINE TARTRATE 0.5 (11)-1
KIT ORAL
Qty: 1 EACH | Refills: 0 | Status: SHIPPED | OUTPATIENT
Start: 2024-10-30

## 2024-11-01 DIAGNOSIS — G47.01 INSOMNIA DUE TO MEDICAL CONDITION: ICD-10-CM

## 2024-11-04 RX ORDER — QUETIAPINE FUMARATE 200 MG/1
TABLET, FILM COATED ORAL
Qty: 30 TABLET | Refills: 3 | Status: SHIPPED | OUTPATIENT
Start: 2024-11-04

## 2024-11-05 ENCOUNTER — TELEPHONE (OUTPATIENT)
Dept: PRIMARY CARE CLINIC | Facility: CLINIC | Age: 74
End: 2024-11-05
Payer: MEDICARE

## 2024-11-05 DIAGNOSIS — J44.9 STAGE 2 MODERATE COPD BY GOLD CLASSIFICATION: ICD-10-CM

## 2024-11-05 DIAGNOSIS — G47.01 INSOMNIA DUE TO MEDICAL CONDITION: ICD-10-CM

## 2024-11-05 DIAGNOSIS — M81.0 OSTEOPOROSIS, UNSPECIFIED OSTEOPOROSIS TYPE, UNSPECIFIED PATHOLOGICAL FRACTURE PRESENCE: ICD-10-CM

## 2024-11-05 RX ORDER — ALENDRONATE SODIUM 70 MG/1
70 TABLET ORAL
Qty: 12 TABLET | Refills: 3 | Status: SHIPPED | OUTPATIENT
Start: 2024-11-05 | End: 2025-11-05

## 2024-11-05 RX ORDER — ALBUTEROL SULFATE 90 UG/1
2 INHALANT RESPIRATORY (INHALATION) EVERY 6 HOURS PRN
Qty: 6.7 G | Refills: 3 | Status: SHIPPED | OUTPATIENT
Start: 2024-11-05

## 2024-11-05 RX ORDER — GABAPENTIN 600 MG/1
600 TABLET ORAL 3 TIMES DAILY
Qty: 270 TABLET | Refills: 0 | Status: SHIPPED | OUTPATIENT
Start: 2024-11-05 | End: 2025-02-03

## 2024-11-05 RX ORDER — MIRTAZAPINE 15 MG/1
15 TABLET, FILM COATED ORAL NIGHTLY
Qty: 30 TABLET | Refills: 5 | Status: SHIPPED | OUTPATIENT
Start: 2024-11-05 | End: 2025-11-05

## 2024-11-05 RX ORDER — BUDESONIDE, GLYCOPYRROLATE, AND FORMOTEROL FUMARATE 160; 9; 4.8 UG/1; UG/1; UG/1
2 AEROSOL, METERED RESPIRATORY (INHALATION) 2 TIMES DAILY
Qty: 5.9 G | Refills: 5 | Status: SHIPPED | OUTPATIENT
Start: 2024-11-05

## 2024-11-05 NOTE — TELEPHONE ENCOUNTER
----- Message from Bin sent at 11/5/2024  9:08 AM CST -----  .Type:  Needs Medical Advice    Who Called: [facility]   Symptoms (please be specific):    How long has patient had these symptoms:    Pharmacy name and phone #:    Would the patient rather a call back or a response via [facility]?   Best Call Back Number: [phone]  Additional Information: Requested a call back from nurse re: medication questions he has for this patient. Please call him back when available.

## 2024-11-15 DIAGNOSIS — F41.9 ANXIETY: ICD-10-CM

## 2024-11-15 RX ORDER — PAROXETINE HYDROCHLORIDE 20 MG/1
20 TABLET, FILM COATED ORAL EVERY MORNING
Qty: 90 TABLET | Refills: 3 | Status: SHIPPED | OUTPATIENT
Start: 2024-11-15 | End: 2025-11-15

## 2024-11-15 NOTE — PROGRESS NOTES
Family Medicine    Patient ID: [id number]     Chief Complaint: Follow-up (Follow up)      HPI:     Jesika Wyman is a 73 y.o. female here today for a follow up.   Anxiety and depression: switched from seroquel to paxil a couple of months prior, then switched back.  Taking Paxil.  Sleeping well.  Had issue when took two instead of one at night.    Osteoporosis: started fosamax, D and calcium supplements, no concerns.  Cachexia: not taking additional protein drinks, appetite ok, no dysphasia.  Does drink soda  Colon ca screening: got cologuard box but have not sent it out yet.  Will do so soon.       Past Medical History:   Diagnosis Date    COPD (chronic obstructive pulmonary disease)     Depression     Hemorrhoids     Insomnia     Scoliosis         Past Surgical History:   Procedure Laterality Date    EGD, WITH BALLOON DILATION OF LESS THAN 30 MILLIMETERS N/A 9/25/2024    Procedure: EGD, WITH BALLOON DILATION OF LESS THAN 30 MILLIMETERS;  Surgeon: Shawn Ohara MD;  Location: [facility] ENDOSCOPY;  Service: Gastroenterology;  Laterality: N/A;  CRRE Balloon Dilation 12 mm to 15mm    EGD, WITH CLOSED BIOPSY N/A 9/25/2024    Procedure: EGD, WITH CLOSED BIOPSY;  Surgeon: Shawn Ohara MD;  Location: [facility] ENDOSCOPY;  Service: Gastroenterology;  Laterality: N/A;    ESOPHAGOGASTRODUODENOSCOPY N/A 9/25/2024    Procedure: EGD;  Surgeon: Shawn Ohara MD;  Location: [facility] ENDOSCOPY;  Service: Gastroenterology;  Laterality: N/A;    HEMORRHOID SURGERY          Social History     Tobacco Use    Smoking status: Every Day     Current packs/day: 0.25     Average packs/day: 0.3 packs/day for 35.0 years (8.8 ttl pk-yrs)     Types: Cigarettes    Smokeless tobacco: Never   Substance and Sexual Activity    Alcohol use: No     Alcohol/week: 0.0 standard drinks of alcohol    Drug use: No    Sexual activity: Not Currently        Current Outpatient Medications   Medication Instructions    albuterol  "(PROVENTIL/VENTOLIN HFA) 90 mcg/actuation inhaler 2 puffs, Inhalation, Every 6 hours PRN, Rescue    alendronate (FOSAMAX) 70 mg, Oral, Every 7 days    budesonide-glycopyr-formoterol (BREZTRI AEROSPHERE) 160-9-4.8 mcg/actuation HFAA 2 puffs, Inhalation, 2 times daily    gabapentin (NEURONTIN) 600 mg, Oral, 3 times daily    HYDROcodone-acetaminophen (NORCO)  mg per tablet 1 tablet, Every 6 hours PRN    mirtazapine (REMERON) 15 mg, Oral, Nightly    paroxetine (PAXIL) 20 mg, Oral, Every morning    QUEtiapine (SEROQUEL) 200 MG Tab TAKE 1 TABLET(200 MG) BY MOUTH EVERY EVENING    varenicline (CHANTIX STARTING MONTH BOX) 0.5 mg (11)- 1 mg (42) tablet Take one 0.5mg tab by mouth once daily X3 days,then increase to one 0.5mg tab twice daily X4 days,then increase to one 1mg tab twice daily       Review of patient's allergies indicates:   Allergen Reactions    Tramadol Nausea And Vomiting    Ibuprofen Nausea And Vomiting        Patient Care Team:  Emilie Billings MD as PCP - General (Family Medicine)  Stephen Larsen Jr., MD as PCP - Internal Medicine (Internal Medicine)  Ariella Osman LPN as Care Coordinator     Subjective:     Review of Systems    12 point review of systems conducted, negative except as stated in the history of present illness. See HPI for details.    Objective:     Visit Vitals  /78   Pulse 88   Ht 5' 6" (1.676 m)   Wt 51.7 kg (114 lb)   SpO2 96%   BMI 18.40 kg/m²       Physical Exam  Vitals and nursing note reviewed.   Constitutional:       Appearance: Normal appearance.   HENT:      Mouth/Throat:      Mouth: Mucous membranes are moist.   Cardiovascular:      Rate and Rhythm: Normal rate and regular rhythm.   Pulmonary:      Effort: Pulmonary effort is normal.      Breath sounds: Normal breath sounds.   Neurological:      General: No focal deficit present.      Mental Status: She is alert.   Psychiatric:         Mood and Affect: Mood normal.       Labs Reviewed:     Chemistry:  Lab Results " "  Component Value Date     06/29/2016    K 3.5 06/29/2016    BUN 12 06/29/2016    CREATININE 0.8 06/29/2016    CALCIUM 9.2 06/29/2016    ALKPHOS 53 (L) 06/29/2016    ALBUMIN 3.6 06/29/2016    AST 20 06/29/2016    ALT 14 06/29/2016    TSH 2.660 06/29/2016        No results found for: "HGBA1C", "MICROALBCREA"     Hematology:  Lab Results   Component Value Date    WBC 6.10 01/04/2016    HGB 14.0 01/04/2016    HCT 43.5 01/04/2016     (H) 01/04/2016       Lipid Panel:  Lab Results   Component Value Date    CHOL 148 06/10/2024    HDL 49 06/10/2024    TRIG 98 06/10/2024    TOTALCHOLEST 2.9 01/04/2016        Urine:  Lab Results   Component Value Date    CREATRANDUR 89.9 07/19/2022        Assessment:       ICD-10-CM ICD-9-CM   1. Anxiety  F41.9 300.00   2. Abnormal finding of blood chemistry, unspecified  R79.9 790.6   3. Depression with anxiety  F41.8 300.4   4. Age-related osteoporosis without current pathological fracture  M81.0 733.01   5. Cachexia  R64 799.4        Plan:     1. Anxiety  -     CBC Auto Differential; Future; Expected date: 05/18/2025  -     Comprehensive Metabolic Panel; Future; Expected date: 05/18/2025  -     Lipid Panel; Future; Expected date: 05/18/2025  -     TSH; Future; Expected date: 05/18/2025  -     Hepatitis C Antibody; Future; Expected date: 05/18/2025    2. Abnormal finding of blood chemistry, unspecified  -     CBC Auto Differential; Future; Expected date: 05/18/2025  -     Lipid Panel; Future; Expected date: 05/18/2025    3. Depression with anxiety  Overview:  Stop Seroquel. Start Paxil at bedtime.        4. Age-related osteoporosis without current pathological fracture  Overview:  Take fosamax once a week, stay upright for at least 30 minutes after taking it.  Take vitamin D and Calcium.        5. Cachexia  Overview:  Continue additional protein drinks between meals, continue Remeron.             Follow up in about 6 months (around 5/18/2025) for With labwork prior to visit, " Annual Wellness. In addition to their scheduled follow up, the patient has also been instructed to follow up on as needed basis.     Future Appointments   Date Time Provider Department Center   5/19/2025  9:15 AM [facility]

## 2024-11-18 ENCOUNTER — OFFICE VISIT (OUTPATIENT)
Dept: PRIMARY CARE CLINIC | Facility: CLINIC | Age: 74
End: 2024-11-18
Payer: MEDICARE

## 2024-11-18 VITALS
BODY MASS INDEX: 18.32 KG/M2 | DIASTOLIC BLOOD PRESSURE: 78 MMHG | HEART RATE: 88 BPM | HEIGHT: 66 IN | WEIGHT: 114 LBS | SYSTOLIC BLOOD PRESSURE: 127 MMHG | OXYGEN SATURATION: 96 %

## 2024-11-18 DIAGNOSIS — F41.8 DEPRESSION WITH ANXIETY: ICD-10-CM

## 2024-11-18 DIAGNOSIS — F41.9 ANXIETY: Primary | ICD-10-CM

## 2024-11-18 DIAGNOSIS — R64 CACHEXIA: Chronic | ICD-10-CM

## 2024-11-18 DIAGNOSIS — R79.9 ABNORMAL FINDING OF BLOOD CHEMISTRY, UNSPECIFIED: ICD-10-CM

## 2024-11-18 DIAGNOSIS — M81.0 AGE-RELATED OSTEOPOROSIS WITHOUT CURRENT PATHOLOGICAL FRACTURE: ICD-10-CM

## 2024-11-18 PROBLEM — R05.9 COUGH: Status: RESOLVED | Noted: 2017-08-25 | Resolved: 2024-11-18

## 2024-11-18 PROCEDURE — 99213 OFFICE O/P EST LOW 20 MIN: CPT | Mod: ,,, | Performed by: FAMILY MEDICINE

## 2024-11-18 PROCEDURE — 3288F FALL RISK ASSESSMENT DOCD: CPT | Mod: CPTII,,, | Performed by: FAMILY MEDICINE

## 2024-11-18 PROCEDURE — 1126F AMNT PAIN NOTED NONE PRSNT: CPT | Mod: CPTII,,, | Performed by: FAMILY MEDICINE

## 2024-11-18 PROCEDURE — 3008F BODY MASS INDEX DOCD: CPT | Mod: CPTII,,, | Performed by: FAMILY MEDICINE

## 2024-11-18 PROCEDURE — 3078F DIAST BP <80 MM HG: CPT | Mod: CPTII,,, | Performed by: FAMILY MEDICINE

## 2024-11-18 PROCEDURE — 1101F PT FALLS ASSESS-DOCD LE1/YR: CPT | Mod: CPTII,,, | Performed by: FAMILY MEDICINE

## 2024-11-18 PROCEDURE — 1159F MED LIST DOCD IN RCRD: CPT | Mod: CPTII,,, | Performed by: FAMILY MEDICINE

## 2024-11-18 PROCEDURE — 3074F SYST BP LT 130 MM HG: CPT | Mod: CPTII,,, | Performed by: FAMILY MEDICINE

## 2024-11-18 PROCEDURE — 1160F RVW MEDS BY RX/DR IN RCRD: CPT | Mod: CPTII,,, | Performed by: FAMILY MEDICINE

## 2024-11-22 ENCOUNTER — TELEPHONE (OUTPATIENT)
Dept: PRIMARY CARE CLINIC | Facility: CLINIC | Age: 74
End: 2024-11-22
Payer: MEDICARE

## 2024-11-22 NOTE — TELEPHONE ENCOUNTER
----- Message from Leticia sent at 11/22/2024 11:10 AM CST -----  Who Called: Jesika MOISE Garo    Caller is requesting assistance/information from provider's office.    Symptoms (please be specific): n/a   How long has patient had these symptoms:  n/a   List of preferred pharmacies on file (remove unneeded): [unfilled]  If different, enter pharmacy into here including location and phone number: n/a       Preferred Method of Contact: Phone Call  Patient's Preferred Phone Number on File: [phone]   Best Call Back Number, if different:  Additional Information: medical advice,pt called to confirm/discuss when was her last MRI taken, please advise, thanks

## 2025-02-27 DIAGNOSIS — G47.01 INSOMNIA DUE TO MEDICAL CONDITION: ICD-10-CM

## 2025-02-27 RX ORDER — QUETIAPINE FUMARATE 200 MG/1
TABLET, FILM COATED ORAL
Qty: 30 TABLET | Refills: 3 | Status: SHIPPED | OUTPATIENT
Start: 2025-02-27

## 2025-03-12 DIAGNOSIS — M81.0 OSTEOPOROSIS, UNSPECIFIED OSTEOPOROSIS TYPE, UNSPECIFIED PATHOLOGICAL FRACTURE PRESENCE: ICD-10-CM

## 2025-03-12 DIAGNOSIS — F17.200 TOBACCO DEPENDENCE: Primary | ICD-10-CM

## 2025-03-12 RX ORDER — VARENICLINE TARTRATE 1 MG/1
TABLET, FILM COATED ORAL
Qty: 52 TABLET | Refills: 11 | Status: SHIPPED | OUTPATIENT
Start: 2025-03-12

## 2025-03-12 RX ORDER — GABAPENTIN 600 MG/1
600 TABLET ORAL 3 TIMES DAILY
Qty: 270 TABLET | Refills: 3 | Status: SHIPPED | OUTPATIENT
Start: 2025-03-12

## 2025-03-31 DIAGNOSIS — G47.01 INSOMNIA DUE TO MEDICAL CONDITION: ICD-10-CM

## 2025-03-31 DIAGNOSIS — J44.9 STAGE 2 MODERATE COPD BY GOLD CLASSIFICATION: ICD-10-CM

## 2025-03-31 RX ORDER — MIRTAZAPINE 15 MG/1
15 TABLET, FILM COATED ORAL NIGHTLY
Qty: 90 TABLET | Refills: 3 | Status: SHIPPED | OUTPATIENT
Start: 2025-03-31

## 2025-03-31 RX ORDER — BUDESONIDE, GLYCOPYRROLATE, AND FORMOTEROL FUMARATE 160; 9; 4.8 UG/1; UG/1; UG/1
2 AEROSOL, METERED RESPIRATORY (INHALATION) 2 TIMES DAILY
Qty: 10.7 G | Refills: 3 | Status: SHIPPED | OUTPATIENT
Start: 2025-03-31

## (undated) DEVICE — KIT CANIST SUCTION 1200CC

## (undated) DEVICE — Device

## (undated) DEVICE — TUBING O2 FEMALE CONN 13FT

## (undated) DEVICE — TIP SUCTION YANKAUER

## (undated) DEVICE — CONTAINER SPECIMEN SCREW 4OZ

## (undated) DEVICE — COLLECTION SPECIMEN NEPTUNE

## (undated) DEVICE — KIT SURGICAL COLON .25 1.1OZ

## (undated) DEVICE — BAG LABGUARD BIOHAZARD 6X9IN

## (undated) DEVICE — BITE BLOCK ADULT LATEX FREE

## (undated) DEVICE — FORCEP BX LG CAP 2.8MMX240CM

## (undated) DEVICE — INFLATOR ENDO BLLN ALLIANCE II

## (undated) DEVICE — SOL IRRI STRL WATER 1000ML